# Patient Record
Sex: MALE | Race: WHITE | Employment: OTHER | URBAN - METROPOLITAN AREA
[De-identification: names, ages, dates, MRNs, and addresses within clinical notes are randomized per-mention and may not be internally consistent; named-entity substitution may affect disease eponyms.]

---

## 2024-10-30 ENCOUNTER — APPOINTMENT (OUTPATIENT)
Dept: CT IMAGING | Age: 74
End: 2024-10-30
Payer: MEDICARE

## 2024-10-30 ENCOUNTER — HOSPITAL ENCOUNTER (INPATIENT)
Age: 74
LOS: 2 days | Discharge: HOME | End: 2024-11-01
Attending: EMERGENCY MEDICINE | Admitting: SURGERY
Payer: MEDICARE

## 2024-10-30 ENCOUNTER — APPOINTMENT (OUTPATIENT)
Dept: GENERAL RADIOLOGY | Age: 74
End: 2024-10-30
Payer: MEDICARE

## 2024-10-30 DIAGNOSIS — R09.02 HYPOXEMIA: ICD-10-CM
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
## 2024-10-30 DIAGNOSIS — S22.000A COMPRESSION FRACTURE OF BODY OF THORACIC VERTEBRA (HCC): ICD-10-CM

## 2024-10-30 DIAGNOSIS — S22.49XA CLOSED FRACTURE OF MULTIPLE RIBS, UNSPECIFIED LATERALITY, INITIAL ENCOUNTER: ICD-10-CM

## 2024-10-30 PROBLEM — T14.90XA TRAUMA: Status: ACTIVE | Noted: 2024-10-30

## 2024-10-30 LAB
ALBUMIN SERPL-MCNC: 4.4 G/DL (ref 3.5–5.2)
ALP SERPL-CCNC: 131 U/L (ref 40–129)
ALT SERPL-CCNC: 25 U/L (ref 0–40)
ANION GAP SERPL CALCULATED.3IONS-SCNC: 10 MMOL/L (ref 7–16)
APAP SERPL-MCNC: <5 UG/ML (ref 10–30)
AST SERPL-CCNC: 46 U/L (ref 0–39)
B.E.: -2.5 MMOL/L (ref -3–3)
BILIRUB SERPL-MCNC: 1.6 MG/DL (ref 0–1.2)
BUN SERPL-MCNC: 15 MG/DL (ref 6–23)
CALCIUM SERPL-MCNC: 9.1 MG/DL (ref 8.6–10.2)
CHLORIDE SERPL-SCNC: 104 MMOL/L (ref 98–107)
CO2 SERPL-SCNC: 24 MMOL/L (ref 22–29)
COHB: 0.2 % (ref 0–1.5)
CREAT SERPL-MCNC: 0.9 MG/DL (ref 0.7–1.2)
CRITICAL: ABNORMAL
DATE ANALYZED: ABNORMAL
DATE OF COLLECTION: ABNORMAL
ERYTHROCYTE [DISTWIDTH] IN BLOOD BY AUTOMATED COUNT: 17 % (ref 11.5–15)
ETHANOLAMINE SERPL-MCNC: <10 MG/DL (ref 0–0.08)
GFR, ESTIMATED: >90 ML/MIN/1.73M2
GLUCOSE SERPL-MCNC: 110 MG/DL (ref 74–99)
HCO3: 20.9 MMOL/L (ref 22–26)
HCT VFR BLD AUTO: 36.5 % (ref 37–54)
HGB BLD-MCNC: 11.5 G/DL (ref 12.5–16.5)
HHB: 0.3 % (ref 0–5)
INR PPP: 1.2
LAB: ABNORMAL
LACTATE BLDV-SCNC: 1.4 MMOL/L (ref 0.5–2.2)
Lab: 1635
MCH RBC QN AUTO: 27.4 PG (ref 26–35)
MCHC RBC AUTO-ENTMCNC: 31.5 G/DL (ref 32–34.5)
MCV RBC AUTO: 86.9 FL (ref 80–99.9)
METHB: 0.6 % (ref 0–1.5)
MODE: ABNORMAL
O2 SATURATION: 99.7 % (ref 92–98.5)
O2HB: 98.9 % (ref 94–97)
OPERATOR ID: 7278
PARTIAL THROMBOPLASTIN TIME: 29.9 SEC (ref 24.5–35.1)
PATIENT TEMP: 37 C
PCO2: 32.2 MMHG (ref 35–45)
PH BLOOD GAS: 7.43 (ref 7.35–7.45)
PLATELET # BLD AUTO: 197 K/UL (ref 130–450)
PMV BLD AUTO: 9.4 FL (ref 7–12)
PO2: 291.7 MMHG (ref 75–100)
POTASSIUM SERPL-SCNC: 4.04 MMOL/L (ref 3.5–5)
POTASSIUM SERPL-SCNC: 4.1 MMOL/L (ref 3.5–5)
PROT SERPL-MCNC: 7 G/DL (ref 6.4–8.3)
PROTHROMBIN TIME: 13.1 SEC (ref 9.3–12.4)
RBC # BLD AUTO: 4.2 M/UL (ref 3.8–5.8)
SALICYLATES SERPL-MCNC: <0.3 MG/DL (ref 0–30)
SODIUM SERPL-SCNC: 138 MMOL/L (ref 132–146)
SOURCE, BLOOD GAS: ABNORMAL
THB: 12.5 G/DL (ref 11.5–16.5)
TIME ANALYZED: 1636
TOXIC TRICYCLIC SC,BLOOD: NEGATIVE
WBC OTHER # BLD: 17.3 K/UL (ref 4.5–11.5)

## 2024-10-30 PROCEDURE — 72125 CT NECK SPINE W/O DYE: CPT

## 2024-10-30 PROCEDURE — 70450 CT HEAD/BRAIN W/O DYE: CPT

## 2024-10-30 PROCEDURE — 6810039000 HC L1 TRAUMA ALERT

## 2024-10-30 PROCEDURE — 90714 TD VACC NO PRESV 7 YRS+ IM: CPT

## 2024-10-30 PROCEDURE — 71045 X-RAY EXAM CHEST 1 VIEW: CPT

## 2024-10-30 PROCEDURE — 2580000003 HC RX 258

## 2024-10-30 PROCEDURE — 72131 CT LUMBAR SPINE W/O DYE: CPT

## 2024-10-30 PROCEDURE — 82805 BLOOD GASES W/O2 SATURATION: CPT

## 2024-10-30 PROCEDURE — 99285 EMERGENCY DEPT VISIT HI MDM: CPT

## 2024-10-30 PROCEDURE — 99223 1ST HOSP IP/OBS HIGH 75: CPT | Performed by: SURGERY

## 2024-10-30 PROCEDURE — G0480 DRUG TEST DEF 1-7 CLASSES: HCPCS

## 2024-10-30 PROCEDURE — 2060000000 HC ICU INTERMEDIATE R&B

## 2024-10-30 PROCEDURE — 0HQ0XZZ REPAIR SCALP SKIN, EXTERNAL APPROACH: ICD-10-PCS

## 2024-10-30 PROCEDURE — 72128 CT CHEST SPINE W/O DYE: CPT

## 2024-10-30 PROCEDURE — 80307 DRUG TEST PRSMV CHEM ANLYZR: CPT

## 2024-10-30 PROCEDURE — 74177 CT ABD & PELVIS W/CONTRAST: CPT

## 2024-10-30 PROCEDURE — 80143 DRUG ASSAY ACETAMINOPHEN: CPT

## 2024-10-30 PROCEDURE — 72170 X-RAY EXAM OF PELVIS: CPT

## 2024-10-30 PROCEDURE — 71260 CT THORAX DX C+: CPT

## 2024-10-30 PROCEDURE — 80179 DRUG ASSAY SALICYLATE: CPT

## 2024-10-30 PROCEDURE — 6370000000 HC RX 637 (ALT 250 FOR IP)

## 2024-10-30 PROCEDURE — 85027 COMPLETE CBC AUTOMATED: CPT

## 2024-10-30 PROCEDURE — 96372 THER/PROPH/DIAG INJ SC/IM: CPT

## 2024-10-30 PROCEDURE — 83605 ASSAY OF LACTIC ACID: CPT

## 2024-10-30 PROCEDURE — 84132 ASSAY OF SERUM POTASSIUM: CPT

## 2024-10-30 PROCEDURE — 85610 PROTHROMBIN TIME: CPT

## 2024-10-30 PROCEDURE — 6360000002 HC RX W HCPCS

## 2024-10-30 PROCEDURE — 80053 COMPREHEN METABOLIC PANEL: CPT

## 2024-10-30 PROCEDURE — 85730 THROMBOPLASTIN TIME PARTIAL: CPT

## 2024-10-30 PROCEDURE — 90471 IMMUNIZATION ADMIN: CPT

## 2024-10-30 PROCEDURE — 6360000004 HC RX CONTRAST MEDICATION: Performed by: RADIOLOGY

## 2024-10-30 RX ORDER — ASPIRIN 81 MG/1
81 TABLET ORAL EVERY EVENING
Status: ON HOLD | COMMUNITY
End: 2024-11-01

## 2024-10-30 RX ORDER — SODIUM CHLORIDE 9 MG/ML
INJECTION, SOLUTION INTRAVENOUS PRN
Status: DISCONTINUED | OUTPATIENT
Start: 2024-10-30 | End: 2024-11-01 | Stop reason: HOSPADM

## 2024-10-30 RX ORDER — SODIUM CHLORIDE 9 MG/ML
INJECTION, SOLUTION INTRAVENOUS CONTINUOUS
Status: DISCONTINUED | OUTPATIENT
Start: 2024-10-30 | End: 2024-10-30 | Stop reason: CLARIF

## 2024-10-30 RX ORDER — IOPAMIDOL 755 MG/ML
75 INJECTION, SOLUTION INTRAVASCULAR
Status: COMPLETED | OUTPATIENT
Start: 2024-10-30 | End: 2024-10-30

## 2024-10-30 RX ORDER — OXYCODONE HYDROCHLORIDE 5 MG/1
5 TABLET ORAL EVERY 4 HOURS PRN
Status: DISCONTINUED | OUTPATIENT
Start: 2024-10-30 | End: 2024-11-01 | Stop reason: HOSPADM

## 2024-10-30 RX ORDER — ONDANSETRON 4 MG/1
4 TABLET, ORALLY DISINTEGRATING ORAL EVERY 8 HOURS PRN
Status: DISCONTINUED | OUTPATIENT
Start: 2024-10-30 | End: 2024-11-01 | Stop reason: HOSPADM

## 2024-10-30 RX ORDER — ACETAMINOPHEN 325 MG/1
650 TABLET ORAL EVERY 6 HOURS SCHEDULED
Status: DISCONTINUED | OUTPATIENT
Start: 2024-10-30 | End: 2024-10-30 | Stop reason: SDUPTHER

## 2024-10-30 RX ORDER — OXYCODONE HYDROCHLORIDE 10 MG/1
10 TABLET ORAL EVERY 4 HOURS PRN
Status: DISCONTINUED | OUTPATIENT
Start: 2024-10-30 | End: 2024-11-01

## 2024-10-30 RX ORDER — ACETAMINOPHEN 325 MG/1
650 TABLET ORAL EVERY 6 HOURS
Status: DISCONTINUED | OUTPATIENT
Start: 2024-10-30 | End: 2024-11-01 | Stop reason: HOSPADM

## 2024-10-30 RX ORDER — POLYETHYLENE GLYCOL 3350 17 G/17G
17 POWDER, FOR SOLUTION ORAL DAILY
Status: DISCONTINUED | OUTPATIENT
Start: 2024-10-30 | End: 2024-11-01

## 2024-10-30 RX ORDER — ONDANSETRON 2 MG/ML
4 INJECTION INTRAMUSCULAR; INTRAVENOUS EVERY 6 HOURS PRN
Status: DISCONTINUED | OUTPATIENT
Start: 2024-10-30 | End: 2024-11-01 | Stop reason: HOSPADM

## 2024-10-30 RX ORDER — SODIUM CHLORIDE 0.9 % (FLUSH) 0.9 %
10 SYRINGE (ML) INJECTION PRN
Status: DISCONTINUED | OUTPATIENT
Start: 2024-10-30 | End: 2024-11-01 | Stop reason: HOSPADM

## 2024-10-30 RX ORDER — BACITRACIN ZINC 500 [USP'U]/G
OINTMENT TOPICAL 3 TIMES DAILY
Status: DISCONTINUED | OUTPATIENT
Start: 2024-10-30 | End: 2024-11-01 | Stop reason: HOSPADM

## 2024-10-30 RX ORDER — LISINOPRIL 10 MG/1
10 TABLET ORAL EVERY EVENING
COMMUNITY

## 2024-10-30 RX ORDER — ESCITALOPRAM OXALATE 20 MG/1
20 TABLET ORAL EVERY EVENING
COMMUNITY

## 2024-10-30 RX ORDER — SODIUM CHLORIDE 0.9 % (FLUSH) 0.9 %
10 SYRINGE (ML) INJECTION EVERY 12 HOURS SCHEDULED
Status: DISCONTINUED | OUTPATIENT
Start: 2024-10-30 | End: 2024-11-01 | Stop reason: HOSPADM

## 2024-10-30 RX ORDER — MEMANTINE HYDROCHLORIDE 10 MG/1
10 TABLET ORAL 2 TIMES DAILY
COMMUNITY

## 2024-10-30 RX ORDER — SODIUM CHLORIDE 9 MG/ML
INJECTION, SOLUTION INTRAVENOUS CONTINUOUS
Status: DISCONTINUED | OUTPATIENT
Start: 2024-10-30 | End: 2024-10-31

## 2024-10-30 RX ADMIN — IOPAMIDOL 75 ML: 755 INJECTION, SOLUTION INTRAVENOUS at 17:11

## 2024-10-30 RX ADMIN — ACETAMINOPHEN 650 MG: 325 TABLET ORAL at 19:04

## 2024-10-30 RX ADMIN — SODIUM CHLORIDE: 9 INJECTION, SOLUTION INTRAVENOUS at 18:55

## 2024-10-30 RX ADMIN — CLOSTRIDIUM TETANI TOXOID ANTIGEN (FORMALDEHYDE INACTIVATED) AND CORYNEBACTERIUM DIPHTHERIAE TOXOID ANTIGEN (FORMALDEHYDE INACTIVATED) 0.5 ML: 5; 2 INJECTION, SUSPENSION INTRAMUSCULAR at 16:46

## 2024-10-30 RX ADMIN — HYDROMORPHONE HYDROCHLORIDE 0.5 MG: 1 INJECTION, SOLUTION INTRAMUSCULAR; INTRAVENOUS; SUBCUTANEOUS at 18:47

## 2024-10-30 ASSESSMENT — PAIN DESCRIPTION - LOCATION: LOCATION: BACK

## 2024-10-30 ASSESSMENT — PAIN DESCRIPTION - DESCRIPTORS: DESCRIPTORS: ACHING;DISCOMFORT;DULL

## 2024-10-30 ASSESSMENT — PAIN SCALES - GENERAL: PAINLEVEL_OUTOF10: 10

## 2024-10-30 NOTE — ED PROVIDER NOTES
HPI:  10/30/24, Time: 5:53 PM EDT         Andres Aviles is a 74 y.o. male presenting to the ED for fall.  Patient fell off 10 feet onto cement floor from a ladder.  Given his head, he is on aspirin, unclear if loss of conscious.  Does complain of back pain and head pain.  No nausea or vomiting.  No chest pain or shortness breath.  No fevers or chills.  No weakness or numbness in the arms or legs.  He was hemodynamically stable in the field.  No other symptoms or complaints.      Review of Systems:   A complete review of systems was performed and pertinent positives and negatives are stated within HPI, all other systems reviewed and are negative.          --------------------------------------------- PAST HISTORY ---------------------------------------------  Past Medical History:  has no past medical history on file.    Past Surgical History:  has no past surgical history on file.    Social History:      Family History: family history is not on file.     The patient’s home medications have been reviewed.    Allergies: Patient has no known allergies.    -------------------------------------------------- RESULTS -------------------------------------------------  All laboratory and radiology results have been personally reviewed by myself   LABS:  Results for orders placed or performed during the hospital encounter of 10/30/24   Blood Gas, Arterial   Result Value Ref Range    Date Analyzed 20241030     Time Analyzed 1636     Source: Blood Arterial     pH, Blood Gas 7.431 7.350 - 7.450    PCO2 32.2 (L) 35.0 - 45.0 mmHg    PO2 291.7 (H) 75.0 - 100.0 mmHg    HCO3 20.9 (L) 22.0 - 26.0 mmol/L    B.E. -2.5 -3.0 - 3.0 mmol/L    O2 Sat 99.7 (H) 92.0 - 98.5 %    O2Hb 98.9 (H) 94.0 - 97.0 %    COHb 0.2 0.0 - 1.5 %    MetHb 0.6 0.0 - 1.5 %    HHb 0.3 0.0 - 5.0 %    tHb (est) 12.5 11.5 - 16.5 g/dL    Potassium 4.04 3.50 - 5.00 mmol/L    Mode NRB 15L     Date Of Collection 20241030     Time Collected 1635     Pt Temp 37.0 C     present        Darren Tapia MD  10/30/24 1825

## 2024-10-30 NOTE — PROCEDURES
PROCEDURE NOTE  Date: 10/30/2024   Name: Andres Aviles  YOB: 1950    Procedures        LACERATION REPAIR PROCEDURE NOTE    Indication: Laceration - Scalp laceration with arterial bleeding 5 cm posterior scalp     Procedure: The patient was placed in the appropriate position and anesthesia and the area around the laceration was shaved revealing arterial bleeding from the wound. The area was then cleansed with betadine and draped in a sterile fashion. The arterial bleed was first controlled with figure of 8 3-0 vicryl sutures. The laceration was then closed in a running locking fashion with 3-0 vicryl. No further bleeding was noted.  The wound area was cleaned off, again no bleeding was noted.  Minimal debridement was preformed, flaps were aligned. No foreign body was identified.    Total repaired wound length: 5 cm.     Other Items: None    The patient tolerated the procedure well.    Complications: None      Lesly Rice MD  Resident, PGY-4  10/30/2024  5:03 PM

## 2024-10-30 NOTE — PROCEDURES
LACERATION REPAIR NOTE    Attending:  Dr. Cooper    Assistant: Lesly Rice, PGY-4 and Stephy Sargent PGY-2    Anesthesia: not needed    Laceration #: 1.  Location: posterior scalp    Length: 4 cm.    The wound area was irrigated with sterile saline, cleansed with povidone iodine and draped in a sterile fashion.      Wound complexity:    Debridement: None.  Undermining: None.  Wound Margins Revised: None.  Flaps Aligned: yes.  The wound was explored with the following results. Galea intact without any foreign bodies found. There were some subcutaneous vessels bleeding which were controlled with vicryl sutures in figure of eight fashion. The wound was then closed in running locking fashion with vicryl suture  Dressing:  bacitracin was ordered.     Dr. Poe was available    Electronically signed by Stephy Sargent DO on 10/30/2024 at 5:07 PM

## 2024-10-30 NOTE — ED NOTES
PATIENT LOG ROLLED , SPINAL PRECAUTIONS MAINTAINED  NO TENDERNESS , STEP OFFS OR DEFORMITIES   NO SIGNS OF TRAUMA

## 2024-10-30 NOTE — ED NOTES
Patient arrived via STAT , Working in his attic fell 10 ft on head , loss of approx 100cc of blood per STAT

## 2024-10-30 NOTE — H&P
TRAUMA HISTORY & PHYSICAL  Attending/Surgical Resident/Advance Practice Nurse  10/30/2024  5:02 PM    PRIMARY SURVEY    CHIEF COMPLAINT:  Trauma alert.    Injury occurred just prior to arrival. Pt was working in his attic above his garage when it fell out from under him and he fell. Hit his head but denies LOC. Takes ASA 81 daily. Only complaint of back pain    AIRWAY:   Airway Normal    EMS ETT Absent  Noisy respirations Absent  Retractions: Absent  Vomiting/bleeding: Absent    BREATHING:    Midaxillary breath sound left:  Normal  Midaxillary breath sound right:  Normal    Cough sound intensity:  good    FiO2:  15 L non-re breather mask    SMI 2500 mL.     CIRCULATION:   Femerol pulse intensity: Strong  Palpebral conjunctiva: Pink     Vitals:    10/30/24 1654   BP: 125/75   Pulse: 70   Resp: 17   Temp:    SpO2: 96%       Vitals:    10/30/24 1643 10/30/24 1651 10/30/24 1652 10/30/24 1654   BP:  113/81 113/81 125/75   Pulse: 81 74 73 70   Resp:  19 20 17   Temp:       SpO2:  (!) 88% 96% 96%        FAST EXAM: Deferred    Central Nervous System    GCS Initial 15 minutes   Eye  Motor  Verbal 4 - Opens eyes on own  6 - Follows simple motor commands  5 - Alert and oriented 4 - Opens eyes on own  6 - Follows simple motor commands  5 - Alert and oriented     Neuromuscular blockade: No  Pupil size:  Left 4 mm    Right 4 mm  Pupil reaction: Yes    Wiggles fingers: Left Yes Right Yes  Wiggles toes: Left Yes   Right Yes    Hand grasp:   Left  Present      Right  Present  Plantar flexion: Left  Present      Right   Present    Loss of consciousness:  No     History Obtained From:  Patient & EMS  Private Medical Doctor: Unknown, Provider      Pre-exisiting Medical History:  yes    Conditions: HTN    Medications: unsure of medications    Allergies: denies    Social History:   Tobacco use:  none  Alcohol use:  none  Illicit drug use:  no history of illicit drug use    Past Surgical History:  hernia repair    Anticoagulant use:  None  Antiplatelet use:   ASA    NSAID use in last 72 hours: no  Taken PCN in past:  yes  Last food/drink: lunch today  Last tetanus: unknown, updated in trauma bay     Family History:   No family history of anesthesia complications    Complaints:   Head:  None  Neck:   None  Chest:   None  Back:   Mild  Abdomen:   None  Extremities:   None  Comments:     Review of systems:  All negative unless otherwise noted.        SECONDARY SURVEY  Head/scalp: 4 cm laceration to posterior scalp    Face: Atraumatic    Eyes/ears/nose: Atraumatic      Pharynx/mouth: Atraumatic      Neck:  Atraumatic      Cervical spine tenderness:  Cervical collar in place at time of arrival  Pain:  none  ROM:  Not indicated     Chest wall:   Atraumatic       Heart:   Regular rate & rhythm    Abdomen:  Atraumatic.  Soft ND  Tenderness:  none    Pelvis: Atraumatic      Tenderness: none    Thoracolumbar spine: Atraumatic     Tenderness:  none    Genitourinary:  Atraumatic.  No blood or urine noted    Rectum: Atraumatic.  No blood noted.      Perineum: Atraumatic.  No blood or urine noted.      Extremities:   Sensory normal  Motor normal    Distal Pulses  Left arm normal  Right arm normal  Left leg normal  Right leg normal    Capillary refill  Left arm normal  Right arm normal  Left leg normal  Right leg normal    Procedures in ED:  Femoral arterial puncture and Laceration repair    In the event of Emergency Blood Transfusion:  Due to the critical condition of this patient, I request the immediate release of blood products for emergency transfusion secondary to shock. I understand the increased risks incurred by the lack of complete transfusion testing.      Radiology: Chest Xray, Pelvic Xray, Ct head, Ct cervical spine, CT chest, CT abdomen     Consultations: TBD pending scans    Admission/Diagnosis: s/p fall       Plan of Treatment:   - labs   - scans   - monitor hemodynamics   - local wound care to lac repair   - tertiary   - dispo pending

## 2024-10-31 ENCOUNTER — APPOINTMENT (OUTPATIENT)
Dept: CT IMAGING | Age: 74
End: 2024-10-31
Payer: MEDICARE

## 2024-10-31 ENCOUNTER — APPOINTMENT (OUTPATIENT)
Dept: GENERAL RADIOLOGY | Age: 74
End: 2024-10-31
Payer: MEDICARE

## 2024-10-31 LAB
AMPHET UR QL SCN: NEGATIVE
ANION GAP SERPL CALCULATED.3IONS-SCNC: 9 MMOL/L (ref 7–16)
B.E.: -2 MMOL/L (ref -3–3)
BARBITURATES UR QL SCN: NEGATIVE
BASOPHILS # BLD: 0.02 K/UL (ref 0–0.2)
BASOPHILS NFR BLD: 0 % (ref 0–2)
BENZODIAZ UR QL: NEGATIVE
BUN SERPL-MCNC: 20 MG/DL (ref 6–23)
BUPRENORPHINE UR QL: NEGATIVE
CALCIUM SERPL-MCNC: 8.3 MG/DL (ref 8.6–10.2)
CANNABINOIDS UR QL SCN: NEGATIVE
CHLORIDE SERPL-SCNC: 105 MMOL/L (ref 98–107)
CLOT ANGLE.KAOLIN INDUCED BLD RES TEG: 67.1 DEG (ref 53–70)
CO2 SERPL-SCNC: 23 MMOL/L (ref 22–29)
COCAINE UR QL SCN: NEGATIVE
COHB: 0.5 % (ref 0–1.5)
CREAT SERPL-MCNC: 0.7 MG/DL (ref 0.7–1.2)
CRITICAL: ABNORMAL
DATE ANALYZED: ABNORMAL
DATE OF COLLECTION: ABNORMAL
EOSINOPHIL # BLD: 0.02 K/UL (ref 0.05–0.5)
EOSINOPHILS RELATIVE PERCENT: 0 % (ref 0–6)
EPL-TEG: 1.2 % (ref 0–15)
ERYTHROCYTE [DISTWIDTH] IN BLOOD BY AUTOMATED COUNT: 17.1 % (ref 11.5–15)
FENTANYL UR QL: NEGATIVE
G-TEG: 6.9 KDYN/CM2 (ref 4.5–11)
GFR, ESTIMATED: >90 ML/MIN/1.73M2
GLUCOSE SERPL-MCNC: 102 MG/DL (ref 74–99)
HCO3: 22.2 MMOL/L (ref 22–26)
HCT VFR BLD AUTO: 29.4 % (ref 37–54)
HGB BLD-MCNC: 9.1 G/DL (ref 12.5–16.5)
HHB: 3.9 % (ref 0–5)
IMM GRANULOCYTES # BLD AUTO: <0.03 K/UL (ref 0–0.58)
IMM GRANULOCYTES NFR BLD: 0 % (ref 0–5)
KINETICS TEG: 1.8 MIN (ref 1–3)
LAB: ABNORMAL
LY30 (LYSIS) TEG: 1.2 % (ref 0–8)
LYMPHOCYTES NFR BLD: 0.66 K/UL (ref 1.5–4)
LYMPHOCYTES RELATIVE PERCENT: 9 % (ref 20–42)
Lab: 1010
MA (MAX CLOT) TEG: 57.9 MM (ref 50–70)
MCH RBC QN AUTO: 27.1 PG (ref 26–35)
MCHC RBC AUTO-ENTMCNC: 31 G/DL (ref 32–34.5)
MCV RBC AUTO: 87.5 FL (ref 80–99.9)
METHADONE UR QL: NEGATIVE
METHB: 0.3 % (ref 0–1.5)
MODE: ABNORMAL
MONOCYTES NFR BLD: 0.57 K/UL (ref 0.1–0.95)
MONOCYTES NFR BLD: 8 % (ref 2–12)
NEUTROPHILS NFR BLD: 82 % (ref 43–80)
NEUTS SEG NFR BLD: 5.72 K/UL (ref 1.8–7.3)
O2 SATURATION: 96.1 % (ref 92–98.5)
O2HB: 95.3 % (ref 94–97)
OPERATOR ID: 1868
OPIATES UR QL SCN: NEGATIVE
OXYCODONE UR QL SCN: NEGATIVE
PATIENT TEMP: 37 C
PCO2: 35.8 MMHG (ref 35–45)
PCP UR QL SCN: NEGATIVE
PH BLOOD GAS: 7.41 (ref 7.35–7.45)
PLATELET # BLD AUTO: 140 K/UL (ref 130–450)
PMV BLD AUTO: 10.2 FL (ref 7–12)
PO2: 91.6 MMHG (ref 75–100)
POTASSIUM SERPL-SCNC: 4.1 MMOL/L (ref 3.5–5)
RBC # BLD AUTO: 3.36 M/UL (ref 3.8–5.8)
REACTION TIME TEG: 2.7 MIN (ref 5–10)
SODIUM SERPL-SCNC: 137 MMOL/L (ref 132–146)
SOURCE, BLOOD GAS: ABNORMAL
TEST INFORMATION: NORMAL
THB: 10.3 G/DL (ref 11.5–16.5)
TIME ANALYZED: 1016
WBC OTHER # BLD: 7 K/UL (ref 4.5–11.5)

## 2024-10-31 PROCEDURE — 85347 COAGULATION TIME ACTIVATED: CPT

## 2024-10-31 PROCEDURE — 6370000000 HC RX 637 (ALT 250 FOR IP)

## 2024-10-31 PROCEDURE — 2060000000 HC ICU INTERMEDIATE R&B

## 2024-10-31 PROCEDURE — 85025 COMPLETE CBC W/AUTO DIFF WBC: CPT

## 2024-10-31 PROCEDURE — 2580000003 HC RX 258

## 2024-10-31 PROCEDURE — 80307 DRUG TEST PRSMV CHEM ANLYZR: CPT

## 2024-10-31 PROCEDURE — 6360000002 HC RX W HCPCS: Performed by: SURGERY

## 2024-10-31 PROCEDURE — 6370000000 HC RX 637 (ALT 250 FOR IP): Performed by: SURGERY

## 2024-10-31 PROCEDURE — 82805 BLOOD GASES W/O2 SATURATION: CPT

## 2024-10-31 PROCEDURE — 85390 FIBRINOLYSINS SCREEN I&R: CPT

## 2024-10-31 PROCEDURE — 36415 COLL VENOUS BLD VENIPUNCTURE: CPT

## 2024-10-31 PROCEDURE — 71045 X-RAY EXAM CHEST 1 VIEW: CPT

## 2024-10-31 PROCEDURE — 85576 BLOOD PLATELET AGGREGATION: CPT

## 2024-10-31 PROCEDURE — 80048 BASIC METABOLIC PNL TOTAL CA: CPT

## 2024-10-31 PROCEDURE — 85384 FIBRINOGEN ACTIVITY: CPT

## 2024-10-31 PROCEDURE — 99233 SBSQ HOSP IP/OBS HIGH 50: CPT | Performed by: SURGERY

## 2024-10-31 PROCEDURE — 70450 CT HEAD/BRAIN W/O DYE: CPT

## 2024-10-31 RX ORDER — FOLIC ACID 0.8 MG
800 TABLET ORAL DAILY
COMMUNITY
End: 2024-10-31

## 2024-10-31 RX ORDER — KETOROLAC TROMETHAMINE 30 MG/ML
15 INJECTION, SOLUTION INTRAMUSCULAR; INTRAVENOUS EVERY 6 HOURS
Status: DISCONTINUED | OUTPATIENT
Start: 2024-10-31 | End: 2024-11-01 | Stop reason: HOSPADM

## 2024-10-31 RX ORDER — MEMANTINE HYDROCHLORIDE 10 MG/1
10 TABLET ORAL 2 TIMES DAILY
Status: DISCONTINUED | OUTPATIENT
Start: 2024-10-31 | End: 2024-11-01 | Stop reason: HOSPADM

## 2024-10-31 RX ORDER — ESCITALOPRAM OXALATE 10 MG/1
20 TABLET ORAL DAILY
Status: DISCONTINUED | OUTPATIENT
Start: 2024-10-31 | End: 2024-11-01 | Stop reason: HOSPADM

## 2024-10-31 RX ORDER — LISINOPRIL 5 MG/1
10 TABLET ORAL DAILY
Status: DISCONTINUED | OUTPATIENT
Start: 2024-10-31 | End: 2024-11-01 | Stop reason: HOSPADM

## 2024-10-31 RX ORDER — CHOLECALCIFEROL (VITAMIN D3) 50 MCG
2000 TABLET ORAL DAILY
COMMUNITY

## 2024-10-31 RX ORDER — LIDOCAINE 4 G/G
1 PATCH TOPICAL DAILY
Status: DISCONTINUED | OUTPATIENT
Start: 2024-10-31 | End: 2024-11-01 | Stop reason: HOSPADM

## 2024-10-31 RX ORDER — HYDRALAZINE HYDROCHLORIDE 20 MG/ML
10 INJECTION INTRAMUSCULAR; INTRAVENOUS EVERY 30 MIN PRN
Status: DISCONTINUED | OUTPATIENT
Start: 2024-10-31 | End: 2024-11-01 | Stop reason: HOSPADM

## 2024-10-31 RX ORDER — LABETALOL HYDROCHLORIDE 5 MG/ML
10 INJECTION, SOLUTION INTRAVENOUS EVERY 30 MIN PRN
Status: DISCONTINUED | OUTPATIENT
Start: 2024-10-31 | End: 2024-11-01 | Stop reason: HOSPADM

## 2024-10-31 RX ORDER — METHOCARBAMOL 500 MG/1
1000 TABLET, FILM COATED ORAL 4 TIMES DAILY
Status: DISCONTINUED | OUTPATIENT
Start: 2024-10-31 | End: 2024-11-01

## 2024-10-31 RX ADMIN — MEMANTINE 10 MG: 10 TABLET ORAL at 15:39

## 2024-10-31 RX ADMIN — BACITRACIN ZINC: 500 OINTMENT TOPICAL at 12:10

## 2024-10-31 RX ADMIN — POLYETHYLENE GLYCOL 3350 17 G: 17 POWDER, FOR SOLUTION ORAL at 08:25

## 2024-10-31 RX ADMIN — BACITRACIN ZINC: 500 OINTMENT TOPICAL at 20:01

## 2024-10-31 RX ADMIN — KETOROLAC TROMETHAMINE 15 MG: 30 INJECTION, SOLUTION INTRAMUSCULAR at 08:25

## 2024-10-31 RX ADMIN — KETOROLAC TROMETHAMINE 15 MG: 30 INJECTION, SOLUTION INTRAMUSCULAR at 20:02

## 2024-10-31 RX ADMIN — SODIUM CHLORIDE, PRESERVATIVE FREE 10 ML: 5 INJECTION INTRAVENOUS at 08:25

## 2024-10-31 RX ADMIN — METHOCARBAMOL 1000 MG: 500 TABLET ORAL at 15:34

## 2024-10-31 RX ADMIN — ACETAMINOPHEN 650 MG: 325 TABLET ORAL at 08:26

## 2024-10-31 RX ADMIN — METHOCARBAMOL 1000 MG: 500 TABLET ORAL at 08:24

## 2024-10-31 RX ADMIN — ESCITALOPRAM OXALATE 20 MG: 10 TABLET ORAL at 08:24

## 2024-10-31 RX ADMIN — SODIUM CHLORIDE, PRESERVATIVE FREE 10 ML: 5 INJECTION INTRAVENOUS at 20:12

## 2024-10-31 RX ADMIN — OXYCODONE HYDROCHLORIDE 5 MG: 5 TABLET ORAL at 06:40

## 2024-10-31 RX ADMIN — ACETAMINOPHEN 650 MG: 325 TABLET ORAL at 15:37

## 2024-10-31 RX ADMIN — METHOCARBAMOL 1000 MG: 500 TABLET ORAL at 20:02

## 2024-10-31 RX ADMIN — KETOROLAC TROMETHAMINE 15 MG: 30 INJECTION, SOLUTION INTRAMUSCULAR at 15:33

## 2024-10-31 RX ADMIN — MEMANTINE 10 MG: 10 TABLET ORAL at 20:09

## 2024-10-31 RX ADMIN — LISINOPRIL 10 MG: 10 TABLET ORAL at 08:25

## 2024-10-31 ASSESSMENT — PAIN SCALES - GENERAL
PAINLEVEL_OUTOF10: 3
PAINLEVEL_OUTOF10: 9
PAINLEVEL_OUTOF10: 3
PAINLEVEL_OUTOF10: 3
PAINLEVEL_OUTOF10: 4
PAINLEVEL_OUTOF10: 9
PAINLEVEL_OUTOF10: 6

## 2024-10-31 ASSESSMENT — PAIN DESCRIPTION - DESCRIPTORS
DESCRIPTORS: ACHING;DISCOMFORT;SPASM
DESCRIPTORS: ACHING
DESCRIPTORS: ACHING;DISCOMFORT;SORE
DESCRIPTORS: ACHING;SORE
DESCRIPTORS: ACHING;DISCOMFORT;SPASM
DESCRIPTORS: ACHING
DESCRIPTORS: ACHING;SORE;DISCOMFORT

## 2024-10-31 ASSESSMENT — PAIN DESCRIPTION - LOCATION
LOCATION: BACK;HEAD
LOCATION: BACK
LOCATION: BACK;HEAD
LOCATION: BACK

## 2024-10-31 ASSESSMENT — ENCOUNTER SYMPTOMS
BACK PAIN: 0
NAUSEA: 0
BLOOD IN STOOL: 0
BACK PAIN: 1
ALLERGIC/IMMUNOLOGIC NEGATIVE: 1
COUGH: 0
SHORTNESS OF BREATH: 0
EYES NEGATIVE: 1
CONSTIPATION: 0
GASTROINTESTINAL NEGATIVE: 1
ABDOMINAL PAIN: 0
ANAL BLEEDING: 0
DIARRHEA: 0
ABDOMINAL DISTENTION: 0
RESPIRATORY NEGATIVE: 1
VOMITING: 0

## 2024-10-31 ASSESSMENT — PAIN DESCRIPTION - ORIENTATION
ORIENTATION: LOWER
ORIENTATION: POSTERIOR

## 2024-10-31 NOTE — PROGRESS NOTES
Cervical Spine C Collar Clearance -  Patient CT Spine Imaging normal.  Patient does not complain of Cervical Spine tenderness upon palpation.  Patients C-Spine ranged.  C-spine clear, no need for C-Collar.

## 2024-10-31 NOTE — DISCHARGE INSTRUCTIONS
measures.    Follow-up care is a key part of your treatment and safety. Be sure to make and go to all appointments, and call your doctor or nurse call line if you are having problems. It's also a good idea to know your test results and keep a list of the medicines you take.    How can you care for yourself at home?  Taking care of yourself  You may get dizzy if you do not drink enough water. To prevent dehydration, drink plenty of fluids, enough so that your urine is light yellow or clear like water. Choose water and other caffeine-free clear liquids. If you have kidney, heart, or liver disease and have to limit fluids, talk with your doctor before you increase the amount of fluids you drink.  Exercise regularly to improve your strength, muscle tone, and balance. Walk if you can. Swimming may be a good choice if you cannot walk easily.  Have your vision and hearing checked each year or any time you notice a change. If you have trouble seeing and hearing, you might not be able to avoid objects and could lose your balance.  Know the side effects of the medicines you take. Ask your doctor or pharmacist whether the medicines you take can affect your balance. Sleeping pills or sedatives can affect your balance.  Limit the amount of alcohol you drink. Alcohol can impair your balance and other senses.  Ask your doctor whether calluses or corns on your feet need to be removed. If you wear loose-fitting shoes because of calluses or corns, you can lose your balance and fall.  Talk to your doctor if you have numbness in your feet.    Preventing falls at home  Remove raised doorway thresholds, throw rugs, and clutter. Repair loose carpet or raised areas in the floor.  Move furniture and electrical cords to keep them out of walking paths.  Use non-skid floor wax, and wipe up spills right away, especially on ceramic tile floors.  If you use a walker or cane, put rubber tips on it. If you use crutches, clean the bottoms of them  poor concentration, or slow to answer questions  feeling dizzy, poor balance, or poor coordination  being sensitive to light  being sensitive to sounds  ringing in the ears  a mild headache, sometimes with nausea and/or vomiting  being irritable, having mood swings, or feeling somewhat sad or “down”  Contact the TRAUMA CLINIC if your symptoms are affecting your everyday activities. Remember that letting yourself get too tired can make your symptoms worse. Listen to your body: if doing a certain activity increases your symptoms, take a break from that activity. Build up the amount of time you do an activity and stay under the threshold of symptoms.     Long term Effects (Post-Concussive Syndrome)    Notify physician if any of the following persists longer than 2-4 weeks.    Difficulty with concentration or attention (easily distracted)  Frequent headaches  Memory problems   Sensitivity to light   Sleeping difficulties      There is a higher risk of having a more serious head injury if:  Previous history of head injury or concussion  Taking medicine that thins your blood, or have a bleeding disorder  Have other neurologic (brain) problems  Have difficulty walking or frequent falls  Active in high impact contact sports, like soccer or football.    Activities  Stay away from activities that could cause another head injury (like sports), until the doctor says it's okay. A second blow to the head can cause more damage to the brain  Limit reading, television, video games, etc. the first 48 hours. Your brain needs to rest so that it can recover. You may find that it helps to take time off school or work.   Limit exposure to bright lights, loud noises, and crowds for the first 48 hours, as these can make your symptoms worse  Limit use of screens, such as an IPad, computer, cellphone, TV, etc, as these can make symptoms, especially headaches, worse.      Work/School  It is recommended that you wait to return to work/school  until after your follow-up appointment with trauma or your family doctor.  If you are having no symptoms, please call for an earlier appointment  Some people find it hard to concentrate well so return to your normal activities slowly. Go back to work or school for half days at first, and increase as tolerated. Trauma services can help you with a graduated schedule.  If you feel comfortable doing so, tell work or school about your concussion. You may have to adjust your activities, depending on your job or school demands.       Rest and Sleep  Rest for the first 24 hours; it's one of the best things to help your brain recover.  It's okay to sleep if you want  You don't have to be woken up every few hours.  If someone does wake you up, you should awaken easily.    Do some light physical activity (housework) or light exercise (walking, stationary bike) as soon as you can tolerate the movement.  Strenuous exercise (such as jogging or weight lifting) can make your concussion symptoms worse or last longer.    Diet  Return to a normal diet as tolerated, you may want to start with liquids first  Eat healthy meals (including breakfast) and snacks throughout the day as your brain heals.    Managing Pain  Tylenol or Ibuprofen are the best meds to take for headaches.  Your doctor may have prescribed you medications if your headaches were severe or you have other injuries.  Please take as directed.  Driving  Your ability to concentrate and react quickly might be affected by the concussion.  Please contact trauma services for advice on when to resume driving.  Do not drive if you're concerned about vision problems, slowed thinking, slowed reaction time, reduced attention or poor judgment.  Wear sunglasses even during winter if ayush while driving.  The bright light may induce a headache.     Alcohol use/Drug use  Don't drink alcohol or use recreational drugs as they may make you feel worse and/or hide the warning signs.

## 2024-10-31 NOTE — CONSULTS
Neurosurgery Consult Note      CHIEF COMPLAINT: Fall through an attic floor above his garage sustaining fractures of the thoracic and lumbar spine    HPI: History and physical reviewed films reviewed patient seen and examinedInjury occurred just prior to arrival. Pt was working in his attic above his garage when it fell out from under him and he fell. Hit his head but denies LOC. Takes ASA 81 daily. Only complaint of back pain   .    Past Medical History:   Diagnosis Date    Arthritis     Cerebral artery occlusion with cerebral infarction (HCC)      Past Surgical History:   Procedure Laterality Date    HERNIA REPAIR       Patient has no known allergies.  Prior to Admission medications    Medication Sig Start Date End Date Taking? Authorizing Provider   Cholecalciferol (VITAMIN D3) 50 MCG (2000 UT) TABS Take 1 tablet by mouth daily   Yes Paz Esquivel MD   Folic Acid (FOLATE PO) Take 80 mcg by mouth daily   Yes Paz Esquivel MD   lisinopril (PRINIVIL;ZESTRIL) 10 MG tablet Take 1 tablet by mouth every evening   Yes Paz Esquivel MD   aspirin 81 MG EC tablet Take 1 tablet by mouth every evening   Yes Paz Esquivel MD   memantine (NAMENDA) 10 MG tablet Take 1 tablet by mouth 2 times daily   Yes Paz Esquivel MD   escitalopram (LEXAPRO) 20 MG tablet Take 1 tablet by mouth every evening   Yes Paz Esquivel MD     Outpatient Medications Marked as Taking for the 10/30/24 encounter (Hospital Encounter)   Medication Sig Dispense Refill    Cholecalciferol (VITAMIN D3) 50 MCG (2000 UT) TABS Take 1 tablet by mouth daily      Folic Acid (FOLATE PO) Take 80 mcg by mouth daily      lisinopril (PRINIVIL;ZESTRIL) 10 MG tablet Take 1 tablet by mouth every evening      aspirin 81 MG EC tablet Take 1 tablet by mouth every evening      memantine (NAMENDA) 10 MG tablet Take 1 tablet by mouth 2 times daily      escitalopram (LEXAPRO) 20 MG tablet Take 1 tablet by mouth every evening    levels accompanied by small posterior disc-osteophyte complexes and degenerative disc calcification.  No critical central spinal canal narrowing is seen.  Multilevel foraminal narrowing is present greatest at the C6-7 level on the left. The prevertebral and paravertebral soft tissues show no soft tissue hematoma or acute disease.     1.  No fracture or acute osseous abnormality. 2.  Cervical spine multilevel degenerative changes.     CT ABDOMEN PELVIS W IV CONTRAST Additional Contrast? None    Result Date: 10/30/2024  EXAMINATION: CT OF THE ABDOMEN AND PELVIS WITH CONTRAST 10/30/2024 4:59 pm TECHNIQUE: CT of the abdomen and pelvis was performed with the administration of intravenous contrast. Multiplanar reformatted images are provided for review. Automated exposure control, iterative reconstruction, and/or weight based adjustment of the mA/kV was utilized to reduce the radiation dose to as low as reasonably achievable. COMPARISON: None. HISTORY: ORDERING SYSTEM PROVIDED HISTORY: trauma TECHNOLOGIST PROVIDED HISTORY: Additional Contrast?->None Reason for exam:->trauma What reading provider will be dictating this exam?->CRC FINDINGS: Lower Chest: Mild, bilateral lower lobe and right middle lobe atelectasis. Organs: The liver, spleen, gallbladder, biliary tree, adrenal glands, and kidneys are unremarkable.  The pancreas is atrophic. GI/Bowel: A small hiatal hernia is noted.  The small bowel is unremarkable for a non oral contrast study.  Sigmoid colon diverticulosis is present.  The appendix is normal in appearance. Pelvis: The urinary bladder is well distended and unremarkable.  The seminal vesicles are symmetric in size.  Prostate gland calcifications are noted. Peritoneum/Retroperitoneum: No retroperitoneal or pelvic lymphadenopathy is identified.  No free fluid is seen in the abdomen and pelvis.  No abdominal or pelvic soft tissue mass is appreciated.  The abdominal aorta is atherosclerotic. Bones/Soft Tissues:  There is no effusion or pneumothorax. The cardiomediastinal silhouette is without acute process. The osseous structures are without acute process.     No acute process.       LABS:  CBC:   Lab Results   Component Value Date/Time    WBC 7.0 10/31/2024 07:08 AM    RBC 3.36 10/31/2024 07:08 AM    HGB 9.1 10/31/2024 07:08 AM    HCT 29.4 10/31/2024 07:08 AM    MCV 87.5 10/31/2024 07:08 AM    MCH 27.1 10/31/2024 07:08 AM    MCHC 31.0 10/31/2024 07:08 AM    RDW 17.1 10/31/2024 07:08 AM     10/31/2024 07:08 AM    MPV 10.2 10/31/2024 07:08 AM     BMP:    Lab Results   Component Value Date/Time     10/31/2024 07:08 AM    K 4.1 10/31/2024 07:08 AM     10/31/2024 07:08 AM    CO2 23 10/31/2024 07:08 AM    BUN 20 10/31/2024 07:08 AM    CREATININE 0.7 10/31/2024 07:08 AM    CALCIUM 8.3 10/31/2024 07:08 AM    LABGLOM >90 10/31/2024 07:08 AM    GLUCOSE 102 10/31/2024 07:08 AM     PT/INR:    Lab Results   Component Value Date/Time    PROTIME 13.1 10/30/2024 04:33 PM    INR 1.2 10/30/2024 04:33 PM       IMPRESSION: Acute compression fractures T9 and L2    RECOMMENDATIONS: Needs QuickDraw off-the-shelf TLSO brace will follow with you then PT OT can see and evaluate him    Thank you again for this consultation.      Tuan Puentes MD  10/31/2024

## 2024-10-31 NOTE — PROGRESS NOTES
[facility]  PROGRESS NOTE  ATTENDING NOTE        TRAUMA  MECHANISM:  fall from attic    Chief Complaint   Patient presents with    Trauma     FALL FROM 10 FT ONTO HEAD -LOC -BT        HPI   Trauma alert.    Injury occurred just prior to arrival. Pt was working in his attic above his garage when it fell out from under him and he fell. Hit his head but denies LOC. Takes ASA 81 daily. Only complaint of back pain    Patient Active Problem List   Diagnosis    Trauma       SUBJECTIVE/OVERNIGHT EVENTS:  Scalp laceration repair in trauma bay    Review of Systems   Constitutional:  Positive for activity change. Negative for appetite change and unexpected weight change.   HENT: Negative.     Eyes: Negative.    Respiratory: Negative.  Negative for cough and shortness of breath.    Cardiovascular:  Positive for chest pain. Negative for leg swelling.   Gastrointestinal: Negative.  Negative for abdominal distention, abdominal pain, anal bleeding, blood in stool, constipation, diarrhea, nausea and vomiting.   Endocrine: Negative.    Genitourinary: Negative.    Musculoskeletal:  Positive for myalgias. Negative for arthralgias, back pain, gait problem and joint swelling.   Skin: Negative.    Allergic/Immunologic: Negative.    Neurological:  Positive for headaches. Negative for dizziness and weakness.   Hematological: Negative.    Psychiatric/Behavioral:  Positive for confusion (baseline dementia). Negative for decreased concentration and sleep disturbance.        /69   Pulse 68   Temp 98.2 °F (36.8 °C) (Oral)   Resp 22   SpO2 95%   Physical Exam  Constitutional:       Appearance: Normal appearance.   HENT:      Head: Normocephalic.      Comments: Wound c/d/i     Nose: Nose normal.      Mouth/Throat:      Mouth: Mucous membranes are moist.      Pharynx: Oropharynx is clear.   Eyes:      Extraocular Movements: Extraocular movements intact.      Pupils: Pupils are equal, round, and reactive to light.    Cardiovascular:      Rate and Rhythm: Normal rate and regular rhythm.      Pulses: Normal pulses.      Heart sounds: Normal heart sounds.   Pulmonary:      Effort: Pulmonary effort is normal.      Breath sounds: Normal breath sounds.   Abdominal:      General: There is no distension.      Palpations: Abdomen is soft.      Tenderness: There is no abdominal tenderness.   Musculoskeletal:         General: No tenderness or signs of injury.      Cervical back: Normal range of motion and neck supple.   Skin:     General: Skin is warm and dry.   Neurological:      General: No focal deficit present.      Mental Status: He is alert.      Comments: GCS 14-15   Psychiatric:         Mood and Affect: Mood normal.         Behavior: Behavior normal.         Thought Content: Thought content normal.         Judgment: Judgment normal.         ASSESSMENT/PLAN:  Scalp laceration--LWC  Traumatic cephalohematoma--monitor  Dementia--namenda  T9 fracture--NSGY consult  L2 compression fracture--NSGY consult  HTN--lisinopril  Right rib fractures--analgesia, pulmonary toilet    INCIDENTAL FINDINGS:  none    AMPAC:  TBD/24    DVT/GI ppx:  bilateral SCDs/diet    Marija Sandhu MD, MSc, FACS  10/31/2024  2:58 PM

## 2024-10-31 NOTE — PROGRESS NOTES
GERIATRIC TRAUMA RESIDENT INITIAL ASSESSMENT    Chief Complaint   Patient presents with    Trauma     FALL FROM 10 FT ONTO HEAD -LOC -BT        Mechanism of Injury:  Fell from attic   Loss of consciousness:  No    HPI:  Patient states he was working in his attic above his garage, and he fell. He hit his head, but no loss of consciousness. CT chest shows non-displaced right 6th through 8th rib fracture, CT abdomen shows fracture of the T9 vertebral body. CT head shows large right posterior parietal scalp hematoma. Laceration repair was done. On evaluation today, he denies any acute concern.     No past medical history on file.    No past surgical history on file.    No family history on file.    No Known Allergies         Medications:  Medications are listed below. I have reviewed all the medications against Beer's criteria, and any medicaiton changes have been listed in my assessment/plan.  Medication reconcilliation obtained from   []  Patient      []  Family member                    []  Pharmacy    Current Facility-Administered Medications   Medication Dose Route Frequency Provider Last Rate Last Admin    lisinopril (PRINIVIL;ZESTRIL) tablet 10 mg  10 mg Oral Daily Renea Argueta MD        escitalopram (LEXAPRO) tablet 20 mg  20 mg Oral Daily Renea Argueta MD        [START ON 11/1/2024] vitamin D3 (CHOLECALCIFEROL) tablet 2,500 Units  2,500 Units Oral Q MWF Renea Argueta MD        bacitracin zinc ointment   Topical TID Stephy Sargent DO        0.9 % sodium chloride infusion   IntraVENous Continuous Stephy Sargent DO 75 mL/hr at 10/30/24 1855 New Bag at 10/30/24 1855    sodium chloride flush 0.9 % injection 10 mL  10 mL IntraVENous 2 times per day Lesly Rice MD        sodium chloride flush 0.9 % injection 10 mL  10 mL IntraVENous PRN Lesly Rice MD        0.9 % sodium chloride infusion   IntraVENous PRN Lesly Rice MD        ondansetron (ZOFRAN-ODT) disintegrating tablet 4 mg  4 mg  No tracheal deviation.   Cardiovascular:      Rate and Rhythm: Normal rate.      Heart sounds: No murmur heard.  Pulmonary:      Effort: Pulmonary effort is normal. No respiratory distress.      Breath sounds: Normal breath sounds.   Abdominal:      General: Bowel sounds are normal. There is no distension.      Palpations: Abdomen is soft.      Tenderness: There is no abdominal tenderness.   Musculoskeletal:         General: Normal range of motion.      Cervical back: Normal range of motion.   Skin:     General: Skin is warm and dry.   Neurological:      Mental Status: He is alert and oriented to person, place, and time.   Psychiatric:         Behavior: Behavior normal.         Thought Content: Thought content normal.         Judgment: Judgment normal.           Labs:  All labs since admission were reviewed, and these were the abnormalities I observed:   None    Radiology:  I have reviewed all the radiological studies this admission    ASSESSMENT/PLAN:  Mechanical fall with posterior scalp laceration  Mild cognitive impairment  Continue current medical management  PT/OT consulted

## 2024-10-31 NOTE — PROGRESS NOTES
BRIEF COMPREHENSIVE GERIATRIC ASSESSMENT    Clinical Frailty Score (Melissa Scale):  2-WELL    Cognition:   []  Within normal limits     [x]  Mild cognitive impairment   []  Dementia  []  Delirium    Abbreviated Mental Test (AMT-4) score:  3 (age, , place, year; one point for knowing each)            []  Mental Capacity Assessment required (if AMT-4 score <4/4, consult speech for cog eval)  Main life-long occupation:    Highest level of education:  College    Emotional:  [x]  Within normal limits     []  Dec mood     []  Depression  []  Anxiety  []  Fatigue    []  Hallucination     []  Delusion  []  Other  Motivation:    []  High    [x]  Usual  []  Low  Health attitude:     []  Excellent    [x]  Good    []  Fair    []  Poor     []  Couldn't say  Communication:  Speech:   [x]  WNL   []  Impaired Hearing:   []  WNL   []  Impaired           Vision:     []  WNL   [x]  Impaired (glasses) Understanding:   []  WNL   []  Impaired  Strength:   [x]  WNL   []  Weak Upper:    proximal    distal    Lower:    proximal    distal   Exercise:   []  Frequent    [x]  Occasional    []  None   What type of exercise?  walking  Balance:   [x]  WNL   []  Impaired  # falls in last month:  none #falls in the last 6 months:  none  Mobility:  walk inside      [x]  Independent    []  Slow    []  Assisted    []  Can't         Walk outside   [x]  Independent    []  Slow    []  Assisted    []  Can't         Transfers         [x]  Independent    []  Slow    []  Assisted    []  Can't                    Bed (in/out)     [x]  Independent    []  Slow    []  Assisted    []  Can't                    Aid use            [x]  Independent    []  Slow    []  Assisted    []  Can't   Nutrition:  Weight:  [x]  Normal    []  Under    []  Over    []  Obese   []  Dietary consult           Appetite: [x]  WNL    []  Fair    []  Poor         Patient reported symptoms of dysphagia?  No Nurse reported? No         Patient passed a bedside swallow if

## 2024-10-31 NOTE — DISCHARGE SUMMARY
Physician Discharge Summary     Patient ID:  Andres Aviles  [id number]  74 y.o.  1950    Admit date: 10/30/2024    Discharge date and time: 11/01/24 3:29 PM    Admitting Physician: Marisol Cooper MD     Admission Diagnoses: Hypoxemia [R09.02]  Trauma [T14.90XA]  Fall from ladder, initial encounter [W11.XXXA]  Closed fracture of multiple ribs, unspecified laterality, initial encounter [S22.49XA]  Compression fracture of body of thoracic vertebra (HCC) [S22.000A]    Discharge Diagnoses: Principal Problem:    Trauma  Active Problems:    Accidental fall from ladder  Resolved Problems:    * No resolved hospital problems. *      Admission Condition: fair    Discharged Condition: stable      Hospital Course:    10/30: Presented as trauma alert. Pt was working in his attic above his garage when it fell out from under him and he fell. Hit his head but denies LOC. Takes ASA 81 daily. Only complaint of back pain. Pt was found to have T9 and L2 compression fracture, L1 transverse process fracture, Right rib 6-8th fractures, 5 cm scalp laceration with arterial bleed s/p repair in trauma bay.   10/31: No acute findings on Tert exam. Pt complaining of back pain. Saturating well on 2 L NC. Neurosurgery consulted, awaiting recs.   11/1: Agitated overnight requiring haldol and restraints. Added depokote. Pt worked with PT and got 17/24. He is stable for discharge home.     Consults:   IP CONSULT TO NEUROSURGERY  INPATIENT CONSULT TO ORTHOTIST/PROSTHETIST    Significant Diagnostic Studies:   CT HEAD WO CONTRAST    Result Date: 10/31/2024  EXAMINATION: CT OF THE HEAD WITHOUT CONTRAST  10/31/2024 11:17 am TECHNIQUE: CT of the head was performed without the administration of intravenous contrast. Automated exposure control, iterative reconstruction, and/or weight based adjustment of the mA/kV was utilized to reduce the radiation dose to as low as reasonably achievable. COMPARISON: CT brain, 230, 2024. HISTORY: ORDERING SYSTEM  PROVIDED HISTORY: altered mental status TECHNOLOGIST PROVIDED HISTORY: Has a \"code stroke\" or \"stroke alert\" been called?->No Reason for exam:->altered mental status What reading provider will be dictating this exam?->CRC FINDINGS: BRAIN/VENTRICLES: There is no acute intracranial hemorrhage, mass effect or midline shift.  No abnormal extra-axial fluid collection.  The gray-white differentiation is maintained without evidence of an acute infarct.  There is no evidence of hydrocephalus. Ventricles mildly enlarged with sulci mildly prominent. ORBITS: The visualized portion of the orbits demonstrate no acute abnormality. SINUSES: The visualized paranasal sinuses and mastoid air cells demonstrate minimal mucosal thickening left maxillary sinus.  Congenital AP lesion right frontal sinus.  Mastoid air cells well pneumatized. SOFT TISSUES/SKULL:  Extra calvarial right posterior parietal scalp hematoma extending cephalad to vertex is again identified.  A more focal acute 1.9 x 1.4 cm scalp hematoma is found within the right posterior parietal region near the vertex.     Acute on chronic right posterior parietal scalp hematoma. Mild cerebral atrophy.     XR CHEST PORTABLE    Result Date: 10/31/2024  EXAMINATION: ONE XRAY VIEW OF THE CHEST 10/31/2024 8:56 am COMPARISON: 10/30/2024 HISTORY: ORDERING SYSTEM PROVIDED HISTORY: rib fx TECHNOLOGIST PROVIDED HISTORY: Reason for exam:->rib fx FINDINGS: Heart size is normal.  There are no infiltrates or effusions.  No acute rib fractures are noted.     No acute cardiopulmonary process.     CT LUMBAR SPINE WO CONTRAST    Result Date: 10/30/2024  EXAMINATION: CT OF THE LUMBAR SPINE WITHOUT CONTRAST; CT OF THE THORACIC SPINE WITHOUT CONTRAST  10/30/2024 TECHNIQUE: CT of the lumbar spine was performed without the administration of intravenous contrast. Multiplanar reformatted images are provided for review. Adjustment of mA and/or kV according to patient size was utilized.  Automated

## 2024-10-31 NOTE — PROGRESS NOTES
Patient reexamined for page about him being more altered.  Per nurses patient was trying to get out of bed and remove his IV lines and when asked about what he was doing patient stated that he did not know what he was doing.  He was also pushing the call button instead of volume button although he was redirected to where it was multiple times.  Per wife and son in the room patient is at his baseline.  He has had multiple episodes where he would leave the house and then get lost as he does not know where he is going.  Patient was diagnosed with dementia a year ago and has been on memantine. Upon reevaluation now patient seems to be GCS 14 with no neurological findings.

## 2024-10-31 NOTE — ED NOTES
Went into pt room to check on him, pt had aspen collar off. When this nurse asked why the aspen collar was off pt stated that there was a doctor that came in that made him to neck movements without the collar and said he did not need it on for right now.

## 2024-10-31 NOTE — PROGRESS NOTES
Trauma Tertiary Survey    Admit Date: 10/30/2024  Hospital day 1    CC:    Chief Complaint   Patient presents with    Trauma     FALL FROM 10 FT ONTO HEAD -LOC -BT        Alcohol pre-screening:  Men: How many times in the past year have you had 5 or more drinks in a day?  3 drinks a week.  How much do you drink on a daily basis?  Does not drink daily    Drug Pre-screening:    How many times in the past year have you used a recreational drug or used a prescription medication for non medical reasons?  none    Mood Prescreening:    During the past two weeks, have you been bothered by little interest or pleasure doing things?  No  During the past two weeks, have you been bothered by feeling down, depressed or hopeless?  No      Scheduled Meds:   lisinopril  10 mg Oral Daily    escitalopram  20 mg Oral Daily    [START ON 11/1/2024] vitamin D3  2,500 Units Oral Q MWF    ketorolac  15 mg IntraVENous Q6H    lidocaine  1 patch TransDERmal Daily    methocarbamol  1,000 mg Oral 4x Daily    memantine  10 mg Oral BID    bacitracin zinc   Topical TID    sodium chloride flush  10 mL IntraVENous 2 times per day    polyethylene glycol  17 g Oral Daily    acetaminophen  650 mg Oral Q6H     Continuous Infusions:   sodium chloride 75 mL/hr at 10/30/24 1855    sodium chloride       PRN Meds:labetalol, hydrALAZINE, sodium chloride flush, sodium chloride, ondansetron **OR** ondansetron, oxyCODONE **OR** oxyCODONE, HYDROmorphone    Subjective:     No acute events overnight.  Patient is complaining of moderate back pain otherwise he had no other complaints.     Objective:   Patient Vitals for the past 8 hrs:   BP Temp Temp src Pulse Resp SpO2   10/31/24 1100 (!) 171/72 -- -- 82 17 --   10/31/24 0915 -- -- -- -- -- 95 %   10/31/24 0715 (!) 147/79 98.2 °F (36.8 °C) Oral 69 20 96 %       No intake/output data recorded.  No intake/output data recorded.    No past medical history on file.    Scheduled Meds:   lisinopril  10 mg Oral Daily

## 2024-11-01 VITALS
TEMPERATURE: 97.5 F | HEART RATE: 88 BPM | WEIGHT: 158.07 LBS | OXYGEN SATURATION: 93 % | SYSTOLIC BLOOD PRESSURE: 123 MMHG | HEIGHT: 67 IN | DIASTOLIC BLOOD PRESSURE: 70 MMHG | BODY MASS INDEX: 24.81 KG/M2 | RESPIRATION RATE: 23 BRPM

## 2024-11-01 PROBLEM — S22.000A COMPRESSION FRACTURE OF BODY OF THORACIC VERTEBRA (HCC): Status: ACTIVE | Noted: 2024-11-01

## 2024-11-01 PROBLEM — S22.49XA CLOSED FRACTURE OF MULTIPLE RIBS: Status: ACTIVE | Noted: 2024-11-01
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
## 2024-11-01 LAB
ANION GAP SERPL CALCULATED.3IONS-SCNC: 12 MMOL/L (ref 7–16)
BASOPHILS # BLD: 0.05 K/UL (ref 0–0.2)
BASOPHILS NFR BLD: 1 % (ref 0–2)
BUN SERPL-MCNC: 21 MG/DL (ref 6–23)
CALCIUM SERPL-MCNC: 9 MG/DL (ref 8.6–10.2)
CHLORIDE SERPL-SCNC: 104 MMOL/L (ref 98–107)
CO2 SERPL-SCNC: 23 MMOL/L (ref 22–29)
CREAT SERPL-MCNC: 0.7 MG/DL (ref 0.7–1.2)
EOSINOPHIL # BLD: 0.08 K/UL (ref 0.05–0.5)
EOSINOPHILS RELATIVE PERCENT: 1 % (ref 0–6)
ERYTHROCYTE [DISTWIDTH] IN BLOOD BY AUTOMATED COUNT: 17.2 % (ref 11.5–15)
GFR, ESTIMATED: >90 ML/MIN/1.73M2
GLUCOSE BLD-MCNC: 130 MG/DL (ref 74–99)
GLUCOSE SERPL-MCNC: 119 MG/DL (ref 74–99)
HCT VFR BLD AUTO: 30.7 % (ref 37–54)
HGB BLD-MCNC: 9.5 G/DL (ref 12.5–16.5)
IMM GRANULOCYTES # BLD AUTO: 0.07 K/UL (ref 0–0.58)
IMM GRANULOCYTES NFR BLD: 1 % (ref 0–5)
LYMPHOCYTES NFR BLD: 0.81 K/UL (ref 1.5–4)
LYMPHOCYTES RELATIVE PERCENT: 8 % (ref 20–42)
MCH RBC QN AUTO: 27 PG (ref 26–35)
MCHC RBC AUTO-ENTMCNC: 30.9 G/DL (ref 32–34.5)
MCV RBC AUTO: 87.2 FL (ref 80–99.9)
MONOCYTES NFR BLD: 0.8 K/UL (ref 0.1–0.95)
MONOCYTES NFR BLD: 8 % (ref 2–12)
NEUTROPHILS NFR BLD: 82 % (ref 43–80)
NEUTS SEG NFR BLD: 8.5 K/UL (ref 1.8–7.3)
PLATELET # BLD AUTO: 144 K/UL (ref 130–450)
PMV BLD AUTO: 10.4 FL (ref 7–12)
POTASSIUM SERPL-SCNC: 4.2 MMOL/L (ref 3.5–5)
RBC # BLD AUTO: 3.52 M/UL (ref 3.8–5.8)
SODIUM SERPL-SCNC: 139 MMOL/L (ref 132–146)
WBC OTHER # BLD: 10.3 K/UL (ref 4.5–11.5)

## 2024-11-01 PROCEDURE — 97165 OT EVAL LOW COMPLEX 30 MIN: CPT

## 2024-11-01 PROCEDURE — 6360000002 HC RX W HCPCS: Performed by: SURGERY

## 2024-11-01 PROCEDURE — 82962 GLUCOSE BLOOD TEST: CPT

## 2024-11-01 PROCEDURE — 97161 PT EVAL LOW COMPLEX 20 MIN: CPT

## 2024-11-01 PROCEDURE — 6370000000 HC RX 637 (ALT 250 FOR IP)

## 2024-11-01 PROCEDURE — 80048 BASIC METABOLIC PNL TOTAL CA: CPT

## 2024-11-01 PROCEDURE — 6370000000 HC RX 637 (ALT 250 FOR IP): Performed by: SURGERY

## 2024-11-01 PROCEDURE — 97535 SELF CARE MNGMENT TRAINING: CPT

## 2024-11-01 PROCEDURE — 36415 COLL VENOUS BLD VENIPUNCTURE: CPT

## 2024-11-01 PROCEDURE — 6360000002 HC RX W HCPCS

## 2024-11-01 PROCEDURE — 85025 COMPLETE CBC W/AUTO DIFF WBC: CPT

## 2024-11-01 PROCEDURE — 99238 HOSP IP/OBS DSCHRG MGMT 30/<: CPT | Performed by: SURGERY

## 2024-11-01 PROCEDURE — 97530 THERAPEUTIC ACTIVITIES: CPT

## 2024-11-01 PROCEDURE — 2580000003 HC RX 258

## 2024-11-01 RX ORDER — SENNOSIDES A AND B 8.6 MG/1
1 TABLET, FILM COATED ORAL NIGHTLY
Status: DISCONTINUED | OUTPATIENT
Start: 2024-11-01 | End: 2024-11-01 | Stop reason: HOSPADM

## 2024-11-01 RX ORDER — SENNOSIDES A AND B 8.6 MG/1
1 TABLET, FILM COATED ORAL NIGHTLY
Qty: 30 TABLET | Refills: 0 | Status: SHIPPED | OUTPATIENT
Start: 2024-11-01 | End: 2024-12-01

## 2024-11-01 RX ORDER — METHOCARBAMOL 500 MG/1
500 TABLET, FILM COATED ORAL 4 TIMES DAILY
Status: DISCONTINUED | OUTPATIENT
Start: 2024-11-01 | End: 2024-11-01 | Stop reason: HOSPADM

## 2024-11-01 RX ORDER — HALOPERIDOL 5 MG/ML
5 INJECTION INTRAMUSCULAR ONCE
Status: COMPLETED | OUTPATIENT
Start: 2024-11-01 | End: 2024-11-01

## 2024-11-01 RX ORDER — ASPIRIN 81 MG/1
81 TABLET ORAL EVERY EVENING
Qty: 30 TABLET | Refills: 3 | Status: SHIPPED | OUTPATIENT
Start: 2024-11-08

## 2024-11-01 RX ORDER — BACITRACIN ZINC 500 [USP'U]/G
OINTMENT TOPICAL
Qty: 30 G | Refills: 1 | Status: SHIPPED | OUTPATIENT
Start: 2024-11-01 | End: 2024-11-11

## 2024-11-01 RX ORDER — ONDANSETRON 4 MG/1
4 TABLET, ORALLY DISINTEGRATING ORAL EVERY 8 HOURS PRN
Qty: 21 TABLET | Refills: 0 | Status: SHIPPED | OUTPATIENT
Start: 2024-11-01 | End: 2024-11-08

## 2024-11-01 RX ORDER — OXYCODONE HYDROCHLORIDE 5 MG/1
2.5 TABLET ORAL EVERY 6 HOURS PRN
Qty: 12 TABLET | Refills: 0 | Status: SHIPPED | OUTPATIENT
Start: 2024-11-01 | End: 2024-11-07

## 2024-11-01 RX ORDER — MECOBALAMIN 5000 MCG
5 TABLET,DISINTEGRATING ORAL NIGHTLY
Status: DISCONTINUED | OUTPATIENT
Start: 2024-11-01 | End: 2024-11-01 | Stop reason: HOSPADM

## 2024-11-01 RX ORDER — POLYETHYLENE GLYCOL 3350 17 G/17G
17 POWDER, FOR SOLUTION ORAL 2 TIMES DAILY
Status: DISCONTINUED | OUTPATIENT
Start: 2024-11-01 | End: 2024-11-01 | Stop reason: HOSPADM

## 2024-11-01 RX ORDER — METHOCARBAMOL 500 MG/1
500 TABLET, FILM COATED ORAL 4 TIMES DAILY
Qty: 40 TABLET | Refills: 0 | Status: SHIPPED | OUTPATIENT
Start: 2024-11-01 | End: 2024-11-11

## 2024-11-01 RX ORDER — DIVALPROEX SODIUM 125 MG/1
125 CAPSULE, COATED PELLETS ORAL EVERY 8 HOURS SCHEDULED
Status: DISCONTINUED | OUTPATIENT
Start: 2024-11-01 | End: 2024-11-01 | Stop reason: HOSPADM

## 2024-11-01 RX ORDER — HALOPERIDOL 5 MG/ML
INJECTION INTRAMUSCULAR
Status: COMPLETED
Start: 2024-11-01 | End: 2024-11-01

## 2024-11-01 RX ADMIN — POLYETHYLENE GLYCOL 3350 17 G: 17 POWDER, FOR SOLUTION ORAL at 09:01

## 2024-11-01 RX ADMIN — ACETAMINOPHEN 650 MG: 325 TABLET ORAL at 12:58

## 2024-11-01 RX ADMIN — KETOROLAC TROMETHAMINE 15 MG: 30 INJECTION, SOLUTION INTRAMUSCULAR at 09:01

## 2024-11-01 RX ADMIN — DIVALPROEX SODIUM 125 MG: 125 CAPSULE ORAL at 09:01

## 2024-11-01 RX ADMIN — METHOCARBAMOL 500 MG: 500 TABLET ORAL at 12:58

## 2024-11-01 RX ADMIN — SODIUM CHLORIDE, PRESERVATIVE FREE 10 ML: 5 INJECTION INTRAVENOUS at 09:05

## 2024-11-01 RX ADMIN — BACITRACIN ZINC: 500 OINTMENT TOPICAL at 09:02

## 2024-11-01 RX ADMIN — ESCITALOPRAM OXALATE 20 MG: 10 TABLET ORAL at 09:02

## 2024-11-01 RX ADMIN — DIVALPROEX SODIUM 125 MG: 125 CAPSULE ORAL at 15:27

## 2024-11-01 RX ADMIN — HALOPERIDOL 5 MG: 5 INJECTION INTRAMUSCULAR at 05:00

## 2024-11-01 RX ADMIN — LISINOPRIL 10 MG: 10 TABLET ORAL at 09:02

## 2024-11-01 RX ADMIN — KETOROLAC TROMETHAMINE 15 MG: 30 INJECTION, SOLUTION INTRAMUSCULAR at 15:25

## 2024-11-01 RX ADMIN — MEMANTINE 10 MG: 10 TABLET ORAL at 11:12

## 2024-11-01 RX ADMIN — METHOCARBAMOL 500 MG: 500 TABLET ORAL at 09:02

## 2024-11-01 RX ADMIN — BACITRACIN ZINC: 500 OINTMENT TOPICAL at 15:27

## 2024-11-01 RX ADMIN — HALOPERIDOL LACTATE 5 MG: 5 INJECTION, SOLUTION INTRAMUSCULAR at 05:00

## 2024-11-01 ASSESSMENT — PAIN SCALES - GENERAL
PAINLEVEL_OUTOF10: 3
PAINLEVEL_OUTOF10: 3
PAINLEVEL_OUTOF10: 0

## 2024-11-01 ASSESSMENT — PAIN DESCRIPTION - LOCATION: LOCATION: BACK

## 2024-11-01 ASSESSMENT — PAIN DESCRIPTION - ORIENTATION: ORIENTATION: LOWER

## 2024-11-01 ASSESSMENT — PAIN DESCRIPTION - DESCRIPTORS: DESCRIPTORS: ACHING;SORE

## 2024-11-01 NOTE — PLAN OF CARE
Problem: Safety - Medical Restraint  Goal: Remains free of injury from restraints (Restraint for Interference with Medical Device)  Description: INTERVENTIONS:  1. Determine that other, less restrictive measures have been tried or would not be effective before applying the restraint  2. Evaluate the patient's condition at the time of restraint application  3. Inform patient/family regarding the reason for restraint  4. Q2H: Monitor safety, psychosocial status, comfort, nutrition and hydration  Outcome: Progressing     Problem: Safety - Adult  Goal: Free from fall injury  10/31/2024 2347 by Noe Yañez RN  Outcome: Progressing  10/31/2024 1723 by Michell Jimenez, RN  Outcome: Progressing     Problem: Chronic Conditions and Co-morbidities  Goal: Patient's chronic conditions and co-morbidity symptoms are monitored and maintained or improved  10/31/2024 2347 by Noe Yañez, RN  Outcome: Progressing  10/31/2024 1723 by Michell Jimenez, RN  Outcome: Progressing

## 2024-11-01 NOTE — PROGRESS NOTES
Soft bilateral wrist restraints initiated to maintain patient safety. Patient continues to have poor safety awareness and increased risk for further injury with patients actions. Patient is pulling and removing lines, medical monitoring/ telemetry monitor, attempting to turn off bed alarms, Jumping out of bed without requesting assistance and refusing to wear ordered brace. Attempts to redirect and provide patient education regarding plan of care and importance of maintaining his safety have been unsuccessful at this time and have been unable to redirect the patient. Will continue to monitor closely, provide patient education and evaluate continued need for bilateral wrist restraints.

## 2024-11-01 NOTE — PROGRESS NOTES
Pt no longer requiring restraints. Bedside  ordered for pt safety. Electronically signed by Richie Ocampo RN on 11/1/2024 at 10:44 AM

## 2024-11-01 NOTE — ACP (ADVANCE CARE PLANNING)
Advance Care Planning   Healthcare Decision Maker:    Primary Decision Maker: Shruti Aviles - [facility] - [phone]    Click here to complete Healthcare Decision Makers including selection of the Healthcare Decision Maker Relationship (ie \"Primary\").

## 2024-11-01 NOTE — PROGRESS NOTES
CLINICAL PHARMACY NOTE: MEDS TO BEDS    Total # of Prescriptions Filled: 6   The following medications were delivered to the patient:  Bacitracin oint  Methocarbamol 500 mg  Senna 8.6 mg  Ondansetron 4 mg  Oxycodone 5 mg  Aspirin 81 mg    Additional Documentation:   Son picked up in pharmacy

## 2024-11-01 NOTE — PLAN OF CARE
Problem: Safety - Medical Restraint  Goal: Remains free of injury from restraints (Restraint for Interference with Medical Device)  Description: INTERVENTIONS:  1. Determine that other, less restrictive measures have been tried or would not be effective before applying the restraint  2. Evaluate the patient's condition at the time of restraint application  3. Inform patient/family regarding the reason for restraint  4. Q2H: Monitor safety, psychosocial status, comfort, nutrition and hydration  11/1/2024 0557 by Noe Yañez RN  Outcome: Progressing  10/31/2024 2357 by Noe Yañez RN  Outcome: Progressing  10/31/2024 2347 by Noe Yañez RN  Outcome: Progressing     Problem: Safety - Adult  Goal: Free from fall injury  11/1/2024 0557 by Noe Yañez RN  Outcome: Progressing  10/31/2024 2357 by Noe Yañez RN  Outcome: Progressing  10/31/2024 2347 by Noe Yañez RN  Outcome: Progressing  10/31/2024 1723 by Michell Jimenez RN  Outcome: Progressing

## 2024-11-01 NOTE — PLAN OF CARE
Problem: Safety - Medical Restraint  Goal: Remains free of injury from restraints (Restraint for Interference with Medical Device)  Description: INTERVENTIONS:  1. Determine that other, less restrictive measures have been tried or would not be effective before applying the restraint  2. Evaluate the patient's condition at the time of restraint application  3. Inform patient/family regarding the reason for restraint  4. Q2H: Monitor safety, psychosocial status, comfort, nutrition and hydration  11/1/2024 1046 by Richie Ocampo RN  Outcome: Completed  Flowsheets (Taken 11/1/2024 1000 by Michell Jimenez RN)  Remains free of injury from restraints (restraint for interference with medical device):   Evaluate the patient's condition at the time of restraint application   Inform patient/family regarding the reason for restraint  11/1/2024 0803 by Michell Jimenez RN  Outcome: Progressing  11/1/2024 0557 by Noe Yañez RN  Outcome: Progressing  10/31/2024 2357 by Noe Yañez RN  Outcome: Progressing  10/31/2024 2347 by Noe Yañez RN  Outcome: Progressing

## 2024-11-01 NOTE — PROGRESS NOTES
OCCUPATIONAL THERAPY INITIAL EVALUATION    [facility]  [address], [place]      Date:2024                                                  Patient Name: Andres Aviles  MRN: [id number]  : 1950  Room: [address] [place] [address]    Evaluating OT: Ksenia Arnold MOT, OTR/L  # [id number]    Referring Provider:  Lesly Rice MD   Specific Provider Orders:  \"OT Eval and Treat\"  10-30-24    Diagnosis: Hypoxemia [R09.02]  Trauma [T14.90XA]  Fall from ladder, initial encounter [W11.XXXA]  Closed fracture of multiple ribs, unspecified laterality, initial encounter [S22.49XA]  Compression fracture of body of thoracic vertebra (HCC) [S22.000A]    Pt was admitted after falling ~ 10' off of a ladder onto a concrete floor.  Sustained Compression Fracture T9, L2, Fracture of Transverse Process of L1 and Right Ribs 6-8, Scalp Laceration  Hx of Dementia    Pertinent Medical History:  Pt has a past medical history of Arthritis and Cerebral artery occlusion with cerebral infarction (HCC).,  has a past surgical history that includes hernia repair.    Surgeries this admission: None     Precautions:  Fall Risk  Cognition -   Spinal Precautions, TLSO when upright    Assessment of current deficits   [x] Functional mobility  [x]ADLs  [x] Strength               [x]Cognition   [x] Functional transfers   [x] IADLs         [x] Safety Awareness   [x]Endurance   [] Fine Coordination              [x] Balance     [] Vision/perception   []Sensation    []Gross Motor Coordination  [] ROM  [] Delirium                  [] Motor Control       OT PLAN OF CARE   OT POC based on physician orders, patient diagnosis and results of clinical assessment    Frequency/Duration 1-3 days/wk for 2 weeks PRN   Specific OT Treatment to include:   * Instruction/training on adapted ADL techniques and AE recommendations to increase functional independence within precautions       *  information on past medical history/social history and prior level of function, completion of standardized testing/informal observation of tasks, assessment of data and education on plan of care and goals.            Ksenia Arnold, MOT, OTR/L  # [id number]

## 2024-11-01 NOTE — DISCHARGE INSTR - COC
Continuity of Care Form    Patient Name: Andres Aviles   :  1950  MRN:  [id number]    Admit date:  10/30/2024  Discharge date:  ***    Code Status Order: Full Code   Advance Directives:   Advance Care Flowsheet Documentation             Admitting Physician:  Marisol Cooper MD  PCP: Carissa De Leon APRN - CNP    Discharging Nurse: ***  Discharging Hospital Unit/Room#: 4505/4505-B  Discharging Unit Phone Number: ***    Emergency Contact:   Extended Emergency Contact Information  Primary Emergency Contact: Shruti Aviles  Home Phone: [phone]  Mobile Phone: [phone]  Relation: Spouse  Preferred language: English   needed? No    Past Surgical History:  Past Surgical History:   Procedure Laterality Date    HERNIA REPAIR         Immunization History:   Immunization History   Administered Date(s) Administered    TD 5LF, TENIVAC, (age 7y+), IM, 0.5mL 10/30/2024       Active Problems:  Patient Active Problem List   Diagnosis Code    Trauma T14.90XA    Accidental fall from ladder W11.XXXA       Isolation/Infection:   Isolation            No Isolation          Patient Infection Status       None to display            Nurse Assessment:  Last Vital Signs: /70   Pulse 88   Temp 97.5 °F (36.4 °C) (Temporal)   Resp 23   Ht 1.702 m (5' 7\")   Wt 71.7 kg (158 lb 1.1 oz)   SpO2 93%   BMI 24.76 kg/m²     Last documented pain score (0-10 scale): Pain Level: 3  Last Weight:   Wt Readings from Last 1 Encounters:   24 71.7 kg (158 lb 1.1 oz)     Mental Status:  {IP PT MENTAL STATUS:}    IV Access:  { POOJA IV ACCESS:698172229}    Nursing Mobility/ADLs:  Walking   {CHP DME ADLs:390586983}  Transfer  {CHP DME ADLs:680051784}  Bathing  {CHP DME ADLs:651392477}  Dressing  {CHP DME ADLs:612162402}  Toileting  {CHP DME ADLs:061889252}  Feeding  {CHP DME ADLs:533632037}  Med Admin  {CHP DME ADLs:698947798}  Med Delivery   { POOJA MED Delivery:900863397}    Wound Care Documentation and  Therapy:  Wound 10/31/24 Head Posterior 8 sutures , head trauma (Active)   Wound Etiology Traumatic 10/31/24 2002   Wound Cleansed Cleansed with saline 10/31/24 2002   Dressing/Treatment Pharmaceutical agent (see MAR) 10/31/24 2002   Drainage Amount Small (< 25%) 10/31/24 2002   Drainage Description Serosanguinous 10/31/24 2002   Odor None 10/31/24 2002   Number of days: 0       Wound 10/31/24 Pretibial Right (Active)   Wound Etiology Traumatic 10/31/24 2002   Dressing/Treatment Open to air 10/31/24 2002   Drainage Amount None (dry) 10/31/24 2002   Odor None 10/31/24 2002   Number of days: 0        Elimination:  Continence:   Bowel: {YES / NO:}  Bladder: {YES / NO:}  Urinary Catheter: {Urinary Catheter:008102942}   Colostomy/Ileostomy/Ileal Conduit: {YES / NO:}       Date of Last BM: ***    Intake/Output Summary (Last 24 hours) at 2024 1406  Last data filed at 2024 0547  Gross per 24 hour   Intake 480 ml   Output 200 ml   Net 280 ml     I/O last 3 completed shifts:  In: 480 [P.O.:480]  Out: 200 [Urine:200]    Safety Concerns:     { POOJA Safety Concerns:144351278}    Impairments/Disabilities:      { POOJA Impairments/Disabilities:910343772}    Nutrition Therapy:  Current Nutrition Therapy:   { POOJA Diet List:944367269}    Routes of Feeding: {P DME Other Feedings:298270119}  Liquids: {Slp liquid thickness:49295}  Daily Fluid Restriction: {CHP DME Yes amt example:549576746}  Last Modified Barium Swallow with Video (Video Swallowing Test): {Done Not Done Date:}    Treatments at the Time of Hospital Discharge:   Respiratory Treatments: ***  Oxygen Therapy:  {Therapy; copd oxygen:88457}  Ventilator:    { CC Vent List:251230726}    Rehab Therapies: {THERAPEUTIC INTERVENTION:5974070620}  Weight Bearing Status/Restrictions: { CC Weight Bearin}  Other Medical Equipment (for information only, NOT a DME order):  {EQUIPMENT:559203913}  Other Treatments: ***    Patient's

## 2024-11-01 NOTE — PROGRESS NOTES
Voicemail left for patient's Spouse, Shruti regarding patient difficult night and need for nonviolent wrist restraints.

## 2024-11-01 NOTE — PROGRESS NOTES
TRAUMA SURGERY  DAILY PROGRESS NOTE    Patient's Name/Date of Birth: Andres Aviles / 1950    Date: 2024     Chief Complaint   Patient presents with    Trauma     FALL FROM 10 FT ONTO HEAD -LOC -BT         Subjective:  Pt in bed. Was aggressive overnight requiring haldol and restraints for his safety and safety of staff. He answers questions appropriately and follows commands however appears to be trying to get out of bed.      Objective:  Last 24Hrs  Temp  Av °F (36.7 °C)  Min: 97.2 °F (36.2 °C)  Max: 98.6 °F (37 °C)  Resp  Av.4  Min: 17  Max: 22  Pulse  Av  Min: 64  Max: 85  Systolic (24hrs), Av , Min:119 , Max:171     Diastolic (24hrs), Av, Min:59, Max:80    SpO2  Av %  Min: 92 %  Max: 97 %    I/O last 3 completed shifts:  In: 480 [P.O.:480]  Out: 200 [Urine:200]      General: lying in bed, appears to be trying to get out of bed however restraints present  Cardiovascular: Warm throughout, no edema. Minimal tenderness to right lateral chest wall  Respiratory: no respiratory distress, equal chest rise  Abdomen: soft,  nontender, nondistended  Skin: no obvious rashes or lesions appreciated, no jaundice  Extremities: moving all extremities, in restraints      CBC  Recent Labs     10/30/24  1633 10/31/24  0708   WBC 17.3* 7.0   RBC 4.20 3.36*   HGB 11.5* 9.1*   HCT 36.5* 29.4*   MCV 86.9 87.5   MCH 27.4 27.1   MCHC 31.5* 31.0*   RDW 17.0* 17.1*    140   MPV 9.4 10.2       CMP  Recent Labs     10/30/24  1633 10/30/24  1635 10/31/24  0708     --  137   K 4.1 4.04 4.1     --  105   CO2 24  --  23   BUN 15  --  20   CREATININE 0.9  --  0.7   GLUCOSE 110*  --  102*   CALCIUM 9.1  --  8.3*   BILITOT 1.6*  --   --    ALKPHOS 131*  --   --    AST 46*  --   --    ALT 25  --   --          Assessment/Plan:    Patient Active Problem List   Diagnosis    Trauma       74 y.o. male s/p fall with T9/L2 compression fx, L1 TP fx, R rib 6-8 fx,scalp lac s/p rpr     - depokote

## 2024-11-01 NOTE — PROGRESS NOTES
Soft bilateral wrist restraints Continued to maintain patient safety. Patient continues to have poor safety awareness and increased risk for further injury with patients actions.  Patient is pulling and removing lines, medical monitoring/ telemetry monitor, attempting to grab and bite Nurses assisting at bedside.  Patient increasingly more verbally  and physically aggressive. Attempts to redirect and provide patient education regarding plan of care and importance of maintaining his safety have been unsuccessful at this time and have been unable to redirect the patient. Will continue to monitor closely, provide patient education and evaluate continued need for bilateral wrist restraints.

## 2024-11-01 NOTE — CARE COORDINATION
Transition of Care: Discharge order noted. Met with pt and family at bedside to discuss transition of care. Pt lives with his spouse in a 1 story home with 1 bety. His son and daughter law live next door. Daughter in law is a nurses aid. Ambulated with no assistive device. He has a fww if needed. Independent with adl's. PCP Carissa De Leon CNP. Pharmacy Meds to Beds. Pt agreeable to [facility] for PT/OT. No preference in company. Referral called to [facility], awaiting acceptance. Family will transport home (TF)    JAGDISH Anderson,RN  Case Management  [phone]            Case Management Assessment  Initial Evaluation    Date/Time of Evaluation: 11/1/2024 2:49 PM  Assessment Completed by: BLANCHE CRAIG RN    If patient is discharged prior to next notation, then this note serves as note for discharge by case management.    Patient Name: Andres Aviles                   YOB: 1950  Diagnosis: Hypoxemia [R09.02]  Trauma [T14.90XA]  Fall from ladder, initial encounter [W11.XXXA]  Closed fracture of multiple ribs, unspecified laterality, initial encounter [S22.49XA]  Compression fracture of body of thoracic vertebra (HCC) [S22.000A]                   Date / Time: 10/30/2024  4:22 PM    Patient Admission Status: Inpatient   Readmission Risk (Low < 19, Mod (19-27), High > 27): Readmission Risk Score: 13.2    Current PCP: Carissa De Leon APRN - CNP  PCP verified by CM? Yes    Chart Reviewed: Yes      History Provided by: Patient  Patient Orientation: Alert and Oriented    Patient Cognition: Alert    Hospitalization in the last 30 days (Readmission):  No    If yes, Readmission Assessment in  Navigator will be completed.    Advance Directives:      Code Status: Full Code   Patient's Primary Decision Maker is: Legal Next of Kin    Primary Decision Maker: Shruti Aviles - Spouse - [phone]    Discharge Planning:    Patient lives with: Spouse/Significant Other Type of Home: House  Primary Care Giver: Self  Patient Support

## 2024-11-01 NOTE — PROGRESS NOTES
Physical Therapy    Physical Therapy Initial Assessment     Name: Andres Aviles  : 1950  MRN: [id number]      Date of Service: 2024    Evaluating PT:  Rose Thorne, PT, DPT [id number]    Room #:  4505/4505-B  Diagnosis:  Hypoxemia [R09.02]  Trauma [T14.90XA]  Fall from ladder, initial encounter [W11.XXXA]  Closed fracture of multiple ribs, unspecified laterality, initial encounter [S22.49XA]  Compression fracture of body of thoracic vertebra (HCC) [S22.000A]  PMHx/PSHx:   has a past medical history of Arthritis and Cerebral artery occlusion with cerebral infarction (HCC).  Procedure/Surgery:  None this admission  Precautions:  Fall risk, alarms, Hx of dementia, cognition, TLSO s/p T9/L2 compression fx, L1 TP fx, R rib 6-8 Fx, scalp lac,   Equipment Needs:  TBD    SUBJECTIVE:    Pt lives with his wife in a 1 story home with 1+1 step to enter and 1 rail(s). Pt's son and daughter-in-law live next door. Pt ambulated with no device PTA. He owns a FWW.    OBJECTIVE:   Initial Evaluation  Date: 24 Treatment Date:  Short Term/ Long Term   Goals   AM-PAC 6 Clicks      Was pt agreeable to Eval/treatment? Yes     Does pt have pain? Mild LBP     Bed Mobility  Rolling: NT  Supine to sit: NT  Sit to supine: NT  Scooting: SBA  Rolling: Independent  Supine to sit: Independent  Sit to supine: Independent  Scooting: Independent   Transfers Sit to stand: Zia  Stand to sit: Zia  Stand pivot: Zia with FWW  Sit to stand: Independent  Stand to sit: Independent  Stand pivot: Mod I with AAD   Ambulation    300 feet with FWW and Zia  >600 feet with AAD and Mod I   Stair negotiation: ascended and descended  NT  >1 steps with 1 rail and Mod I   ROM BUE:  Refer to OT  BLE:  WFL     Strength BUE:  Refer to OT  BLE:  Not formally assessed     Balance Sitting EOB:  NT  Dynamic Standing:  Zia with FWW  Sitting EOB:  Independent  Dynamic Standing:  Mod I with AAD     Pt is A & O x 3 (pt was oriented to self,

## 2024-11-01 NOTE — PROGRESS NOTES
Neurosurg progress note  VITALS:  /70   Pulse 88   Temp 97.5 °F (36.4 °C) (Temporal)   Resp 23   Ht 1.702 m (5' 7\")   Wt 71.7 kg (158 lb 1.1 oz)   SpO2 93%   BMI 24.76 kg/m²   24HR INTAKE/OUTPUT:    Intake/Output Summary (Last 24 hours) at 11/1/2024 1437  Last data filed at 11/1/2024 0547  Gross per 24 hour   Intake 480 ml   Output 200 ml   Net 280 ml     CT HEAD WO CONTRAST    Result Date: 10/31/2024  EXAMINATION: CT OF THE HEAD WITHOUT CONTRAST  10/31/2024 11:17 am TECHNIQUE: CT of the head was performed without the administration of intravenous contrast. Automated exposure control, iterative reconstruction, and/or weight based adjustment of the mA/kV was utilized to reduce the radiation dose to as low as reasonably achievable. COMPARISON: CT brain, 230, 2024. HISTORY: ORDERING SYSTEM PROVIDED HISTORY: altered mental status TECHNOLOGIST PROVIDED HISTORY: Has a \"code stroke\" or \"stroke alert\" been called?->No Reason for exam:->altered mental status What reading provider will be dictating this exam?->CRC FINDINGS: BRAIN/VENTRICLES: There is no acute intracranial hemorrhage, mass effect or midline shift.  No abnormal extra-axial fluid collection.  The gray-white differentiation is maintained without evidence of an acute infarct.  There is no evidence of hydrocephalus. Ventricles mildly enlarged with sulci mildly prominent. ORBITS: The visualized portion of the orbits demonstrate no acute abnormality. SINUSES: The visualized paranasal sinuses and mastoid air cells demonstrate minimal mucosal thickening left maxillary sinus.  Congenital AP lesion right frontal sinus.  Mastoid air cells well pneumatized. SOFT TISSUES/SKULL:  Extra calvarial right posterior parietal scalp hematoma extending cephalad to vertex is again identified.  A more focal acute 1.9 x 1.4 cm scalp hematoma is found within the right posterior parietal region near the vertex.     Acute on chronic right posterior parietal scalp hematoma.

## 2024-11-01 NOTE — PLAN OF CARE
Problem: Safety - Medical Restraint  Goal: Remains free of injury from restraints (Restraint for Interference with Medical Device)  Description: INTERVENTIONS:  1. Determine that other, less restrictive measures have been tried or would not be effective before applying the restraint  2. Evaluate the patient's condition at the time of restraint application  3. Inform patient/family regarding the reason for restraint  4. Q2H: Monitor safety, psychosocial status, comfort, nutrition and hydration     Problem: Safety - Adult  Goal: Free from fall injury

## 2025-07-09 ENCOUNTER — HOSPITAL ENCOUNTER (INPATIENT)
Age: 75
LOS: 2 days | Discharge: HOME HEALTH | DRG: 481 | End: 2025-07-11
Attending: EMERGENCY MEDICINE | Admitting: STUDENT IN AN ORGANIZED HEALTH CARE EDUCATION/TRAINING PROGRAM
Payer: MEDICARE

## 2025-07-09 ENCOUNTER — APPOINTMENT (OUTPATIENT)
Dept: CT IMAGING | Age: 75
DRG: 481 | End: 2025-07-09
Payer: MEDICARE

## 2025-07-09 ENCOUNTER — APPOINTMENT (OUTPATIENT)
Dept: GENERAL RADIOLOGY | Age: 75
DRG: 481 | End: 2025-07-09
Payer: MEDICARE

## 2025-07-09 ENCOUNTER — OFFICE VISIT (OUTPATIENT)
Dept: ORTHOPEDIC SURGERY | Age: 75
End: 2025-07-09
Payer: MEDICARE

## 2025-07-09 VITALS — BODY MASS INDEX: 25.9 KG/M2 | WEIGHT: 165 LBS | HEIGHT: 67 IN

## 2025-07-09 DIAGNOSIS — S76.011A STRAIN OF RIGHT HIP, INITIAL ENCOUNTER: Primary | ICD-10-CM

## 2025-07-09 DIAGNOSIS — M16.11 PRIMARY OSTEOARTHRITIS OF RIGHT HIP: ICD-10-CM
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
## 2025-07-09 DIAGNOSIS — S72.001A CLOSED FRACTURE OF RIGHT HIP, INITIAL ENCOUNTER (HCC): Primary | ICD-10-CM

## 2025-07-09 DIAGNOSIS — M25.551 HIP PAIN, RIGHT: ICD-10-CM

## 2025-07-09 DIAGNOSIS — M25.561 ACUTE PAIN OF RIGHT KNEE: ICD-10-CM

## 2025-07-09 DIAGNOSIS — S72.144A CLOSED NONDISPLACED INTERTROCHANTERIC FRACTURE OF RIGHT FEMUR, INITIAL ENCOUNTER (HCC): ICD-10-CM

## 2025-07-09 PROBLEM — S72.141A INTERTROCHANTERIC FRACTURE OF RIGHT FEMUR, CLOSED, INITIAL ENCOUNTER (HCC): Status: ACTIVE | Noted: 2025-07-09

## 2025-07-09 LAB
ABO + RH BLD: NORMAL
ALBUMIN SERPL-MCNC: 4.5 G/DL (ref 3.5–5.2)
ALP SERPL-CCNC: 189 U/L (ref 40–129)
ALT SERPL-CCNC: 15 U/L (ref 0–50)
ANION GAP SERPL CALCULATED.3IONS-SCNC: 14 MMOL/L (ref 7–16)
ARM BAND NUMBER: NORMAL
AST SERPL-CCNC: 23 U/L (ref 0–50)
BASOPHILS # BLD: 0.07 K/UL (ref 0–0.2)
BASOPHILS NFR BLD: 1 % (ref 0–2)
BILIRUB SERPL-MCNC: 1.2 MG/DL (ref 0–1.2)
BLOOD BANK SAMPLE EXPIRATION: NORMAL
BLOOD GROUP ANTIBODIES SERPL: NEGATIVE
BUN SERPL-MCNC: 19 MG/DL (ref 8–23)
CALCIUM SERPL-MCNC: 9.8 MG/DL (ref 8.8–10.2)
CHLORIDE SERPL-SCNC: 98 MMOL/L (ref 98–107)
CO2 SERPL-SCNC: 25 MMOL/L (ref 22–29)
CREAT SERPL-MCNC: 0.8 MG/DL (ref 0.7–1.2)
EOSINOPHIL # BLD: 0.29 K/UL (ref 0.05–0.5)
EOSINOPHILS RELATIVE PERCENT: 3 % (ref 0–6)
ERYTHROCYTE [DISTWIDTH] IN BLOOD BY AUTOMATED COUNT: 16.1 % (ref 11.5–15)
GFR, ESTIMATED: >90 ML/MIN/1.73M2
GLUCOSE SERPL-MCNC: 152 MG/DL (ref 74–99)
HCT VFR BLD AUTO: 42.4 % (ref 37–54)
HGB BLD-MCNC: 12.6 G/DL (ref 12.5–16.5)
IMM GRANULOCYTES # BLD AUTO: <0.03 K/UL (ref 0–0.58)
IMM GRANULOCYTES NFR BLD: 0 % (ref 0–5)
INR PPP: 1.1
LYMPHOCYTES NFR BLD: 1.5 K/UL (ref 1.5–4)
LYMPHOCYTES RELATIVE PERCENT: 16 % (ref 20–42)
MCH RBC QN AUTO: 26.1 PG (ref 26–35)
MCHC RBC AUTO-ENTMCNC: 29.7 G/DL (ref 32–34.5)
MCV RBC AUTO: 88 FL (ref 80–99.9)
MONOCYTES NFR BLD: 0.68 K/UL (ref 0.1–0.95)
MONOCYTES NFR BLD: 7 % (ref 2–12)
NEUTROPHILS NFR BLD: 72 % (ref 43–80)
NEUTS SEG NFR BLD: 6.6 K/UL (ref 1.8–7.3)
PLATELET # BLD AUTO: 286 K/UL (ref 130–450)
PMV BLD AUTO: 9.4 FL (ref 7–12)
POTASSIUM SERPL-SCNC: 4.1 MMOL/L (ref 3.5–5.1)
PROT SERPL-MCNC: 7.7 G/DL (ref 6.4–8.3)
PROTHROMBIN TIME: 12 SEC (ref 9.3–12.4)
RBC # BLD AUTO: 4.82 M/UL (ref 3.8–5.8)
SODIUM SERPL-SCNC: 137 MMOL/L (ref 136–145)
WBC OTHER # BLD: 9.2 K/UL (ref 4.5–11.5)

## 2025-07-09 PROCEDURE — 73700 CT LOWER EXTREMITY W/O DYE: CPT

## 2025-07-09 PROCEDURE — 1123F ACP DISCUSS/DSCN MKR DOCD: CPT | Performed by: PHYSICIAN ASSISTANT

## 2025-07-09 PROCEDURE — 86901 BLOOD TYPING SEROLOGIC RH(D): CPT

## 2025-07-09 PROCEDURE — 6370000000 HC RX 637 (ALT 250 FOR IP): Performed by: STUDENT IN AN ORGANIZED HEALTH CARE EDUCATION/TRAINING PROGRAM

## 2025-07-09 PROCEDURE — 86900 BLOOD TYPING SEROLOGIC ABO: CPT

## 2025-07-09 PROCEDURE — 99285 EMERGENCY DEPT VISIT HI MDM: CPT

## 2025-07-09 PROCEDURE — 2580000003 HC RX 258: Performed by: STUDENT IN AN ORGANIZED HEALTH CARE EDUCATION/TRAINING PROGRAM

## 2025-07-09 PROCEDURE — 80053 COMPREHEN METABOLIC PANEL: CPT

## 2025-07-09 PROCEDURE — 2060000000 HC ICU INTERMEDIATE R&B

## 2025-07-09 PROCEDURE — 73562 X-RAY EXAM OF KNEE 3: CPT

## 2025-07-09 PROCEDURE — 6370000000 HC RX 637 (ALT 250 FOR IP)

## 2025-07-09 PROCEDURE — 71045 X-RAY EXAM CHEST 1 VIEW: CPT

## 2025-07-09 PROCEDURE — 1125F AMNT PAIN NOTED PAIN PRSNT: CPT | Performed by: PHYSICIAN ASSISTANT

## 2025-07-09 PROCEDURE — G8428 CUR MEDS NOT DOCUMENT: HCPCS | Performed by: PHYSICIAN ASSISTANT

## 2025-07-09 PROCEDURE — 1200000000 HC SEMI PRIVATE

## 2025-07-09 PROCEDURE — 99222 1ST HOSP IP/OBS MODERATE 55: CPT | Performed by: STUDENT IN AN ORGANIZED HEALTH CARE EDUCATION/TRAINING PROGRAM

## 2025-07-09 PROCEDURE — 99203 OFFICE O/P NEW LOW 30 MIN: CPT | Performed by: PHYSICIAN ASSISTANT

## 2025-07-09 PROCEDURE — 85610 PROTHROMBIN TIME: CPT

## 2025-07-09 PROCEDURE — 72125 CT NECK SPINE W/O DYE: CPT

## 2025-07-09 PROCEDURE — 87081 CULTURE SCREEN ONLY: CPT

## 2025-07-09 PROCEDURE — G8419 CALC BMI OUT NRM PARAM NOF/U: HCPCS | Performed by: PHYSICIAN ASSISTANT

## 2025-07-09 PROCEDURE — 85025 COMPLETE CBC W/AUTO DIFF WBC: CPT

## 2025-07-09 PROCEDURE — 3017F COLORECTAL CA SCREEN DOC REV: CPT | Performed by: PHYSICIAN ASSISTANT

## 2025-07-09 PROCEDURE — 93005 ELECTROCARDIOGRAM TRACING: CPT | Performed by: EMERGENCY MEDICINE

## 2025-07-09 PROCEDURE — 70450 CT HEAD/BRAIN W/O DYE: CPT

## 2025-07-09 PROCEDURE — 6360000002 HC RX W HCPCS: Performed by: STUDENT IN AN ORGANIZED HEALTH CARE EDUCATION/TRAINING PROGRAM

## 2025-07-09 PROCEDURE — 1036F TOBACCO NON-USER: CPT | Performed by: PHYSICIAN ASSISTANT

## 2025-07-09 PROCEDURE — 86850 RBC ANTIBODY SCREEN: CPT

## 2025-07-09 RX ORDER — POTASSIUM CHLORIDE 7.45 MG/ML
10 INJECTION INTRAVENOUS PRN
Status: DISCONTINUED | OUTPATIENT
Start: 2025-07-09 | End: 2025-07-11 | Stop reason: HOSPADM

## 2025-07-09 RX ORDER — CHOLECALCIFEROL (VITAMIN D3) 50 MCG
2000 TABLET ORAL DAILY
Status: DISCONTINUED | OUTPATIENT
Start: 2025-07-09 | End: 2025-07-11 | Stop reason: HOSPADM

## 2025-07-09 RX ORDER — ESCITALOPRAM OXALATE 20 MG/1
20 TABLET ORAL DAILY
COMMUNITY

## 2025-07-09 RX ORDER — MEMANTINE HYDROCHLORIDE 10 MG/1
10 TABLET ORAL 2 TIMES DAILY
Status: DISCONTINUED | OUTPATIENT
Start: 2025-07-09 | End: 2025-07-11 | Stop reason: HOSPADM

## 2025-07-09 RX ORDER — SODIUM CHLORIDE 9 MG/ML
INJECTION, SOLUTION INTRAVENOUS PRN
Status: DISCONTINUED | OUTPATIENT
Start: 2025-07-09 | End: 2025-07-10 | Stop reason: SDUPTHER

## 2025-07-09 RX ORDER — ONDANSETRON 4 MG/1
4 TABLET, ORALLY DISINTEGRATING ORAL EVERY 8 HOURS PRN
Status: DISCONTINUED | OUTPATIENT
Start: 2025-07-09 | End: 2025-07-11 | Stop reason: HOSPADM

## 2025-07-09 RX ORDER — ACETAMINOPHEN 500 MG
1000 TABLET ORAL ONCE
Status: COMPLETED | OUTPATIENT
Start: 2025-07-09 | End: 2025-07-09

## 2025-07-09 RX ORDER — ACETAMINOPHEN 650 MG/1
650 SUPPOSITORY RECTAL EVERY 6 HOURS PRN
Status: DISCONTINUED | OUTPATIENT
Start: 2025-07-09 | End: 2025-07-11 | Stop reason: HOSPADM

## 2025-07-09 RX ORDER — SODIUM CHLORIDE 9 MG/ML
INJECTION, SOLUTION INTRAVENOUS CONTINUOUS
Status: ACTIVE | OUTPATIENT
Start: 2025-07-09 | End: 2025-07-10

## 2025-07-09 RX ORDER — ENOXAPARIN SODIUM 100 MG/ML
40 INJECTION SUBCUTANEOUS DAILY
Status: DISCONTINUED | OUTPATIENT
Start: 2025-07-09 | End: 2025-07-11 | Stop reason: HOSPADM

## 2025-07-09 RX ORDER — LISINOPRIL 5 MG/1
10 TABLET ORAL DAILY
COMMUNITY

## 2025-07-09 RX ORDER — POLYETHYLENE GLYCOL 3350 17 G/17G
17 POWDER, FOR SOLUTION ORAL DAILY PRN
Status: DISCONTINUED | OUTPATIENT
Start: 2025-07-09 | End: 2025-07-11 | Stop reason: HOSPADM

## 2025-07-09 RX ORDER — SODIUM CHLORIDE 0.9 % (FLUSH) 0.9 %
5-40 SYRINGE (ML) INJECTION EVERY 12 HOURS SCHEDULED
Status: DISCONTINUED | OUTPATIENT
Start: 2025-07-09 | End: 2025-07-10 | Stop reason: SDUPTHER

## 2025-07-09 RX ORDER — SODIUM CHLORIDE 0.9 % (FLUSH) 0.9 %
5-40 SYRINGE (ML) INJECTION PRN
Status: DISCONTINUED | OUTPATIENT
Start: 2025-07-09 | End: 2025-07-10 | Stop reason: SDUPTHER

## 2025-07-09 RX ORDER — LISINOPRIL 10 MG/1
10 TABLET ORAL DAILY
Status: DISCONTINUED | OUTPATIENT
Start: 2025-07-09 | End: 2025-07-11 | Stop reason: HOSPADM

## 2025-07-09 RX ORDER — ACETAMINOPHEN 325 MG/1
650 TABLET ORAL EVERY 6 HOURS PRN
Status: DISCONTINUED | OUTPATIENT
Start: 2025-07-09 | End: 2025-07-11 | Stop reason: HOSPADM

## 2025-07-09 RX ORDER — OXYCODONE AND ACETAMINOPHEN 5; 325 MG/1; MG/1
1 TABLET ORAL EVERY 4 HOURS PRN
Refills: 0 | Status: DISCONTINUED | OUTPATIENT
Start: 2025-07-09 | End: 2025-07-11 | Stop reason: HOSPADM

## 2025-07-09 RX ORDER — ONDANSETRON 2 MG/ML
4 INJECTION INTRAMUSCULAR; INTRAVENOUS EVERY 6 HOURS PRN
Status: DISCONTINUED | OUTPATIENT
Start: 2025-07-09 | End: 2025-07-11 | Stop reason: HOSPADM

## 2025-07-09 RX ORDER — ESCITALOPRAM OXALATE 10 MG/1
20 TABLET ORAL DAILY
Status: DISCONTINUED | OUTPATIENT
Start: 2025-07-09 | End: 2025-07-11 | Stop reason: HOSPADM

## 2025-07-09 RX ORDER — POTASSIUM CHLORIDE 1500 MG/1
40 TABLET, EXTENDED RELEASE ORAL PRN
Status: DISCONTINUED | OUTPATIENT
Start: 2025-07-09 | End: 2025-07-11 | Stop reason: HOSPADM

## 2025-07-09 RX ORDER — MAGNESIUM SULFATE IN WATER 40 MG/ML
2000 INJECTION, SOLUTION INTRAVENOUS PRN
Status: DISCONTINUED | OUTPATIENT
Start: 2025-07-09 | End: 2025-07-11 | Stop reason: HOSPADM

## 2025-07-09 RX ADMIN — MEMANTINE HYDROCHLORIDE 10 MG: 10 TABLET, FILM COATED ORAL at 21:32

## 2025-07-09 RX ADMIN — SODIUM CHLORIDE: 0.9 INJECTION, SOLUTION INTRAVENOUS at 19:01

## 2025-07-09 RX ADMIN — ESCITALOPRAM OXALATE 20 MG: 10 TABLET ORAL at 18:52

## 2025-07-09 RX ADMIN — LISINOPRIL 10 MG: 10 TABLET ORAL at 18:53

## 2025-07-09 RX ADMIN — ENOXAPARIN SODIUM 40 MG: 100 INJECTION SUBCUTANEOUS at 18:52

## 2025-07-09 RX ADMIN — ACETAMINOPHEN 1000 MG: 500 TABLET ORAL at 14:11

## 2025-07-09 ASSESSMENT — PAIN DESCRIPTION - LOCATION: LOCATION: KNEE;LEG

## 2025-07-09 ASSESSMENT — PAIN DESCRIPTION - ORIENTATION: ORIENTATION: RIGHT

## 2025-07-09 ASSESSMENT — ENCOUNTER SYMPTOMS
GASTROINTESTINAL NEGATIVE: 1
EYES NEGATIVE: 1
RESPIRATORY NEGATIVE: 1
ALLERGIC/IMMUNOLOGIC NEGATIVE: 1

## 2025-07-09 ASSESSMENT — PAIN DESCRIPTION - DESCRIPTORS: DESCRIPTORS: ACHING

## 2025-07-09 ASSESSMENT — LIFESTYLE VARIABLES
HOW OFTEN DO YOU HAVE A DRINK CONTAINING ALCOHOL: NEVER
HOW MANY STANDARD DRINKS CONTAINING ALCOHOL DO YOU HAVE ON A TYPICAL DAY: PATIENT DOES NOT DRINK

## 2025-07-09 ASSESSMENT — PAIN - FUNCTIONAL ASSESSMENT: PAIN_FUNCTIONAL_ASSESSMENT: 0-10

## 2025-07-09 ASSESSMENT — PAIN SCALES - GENERAL
PAINLEVEL_OUTOF10: 7
PAINLEVEL_OUTOF10: 0

## 2025-07-09 NOTE — PROGRESS NOTES
[facility]  New Patient Note      CHIEF COMPLAINT:   Chief Complaint   Patient presents with    Hip Pain     Pt presents today with c/o R hip pain x6 days after he fell off of a mower onto the R hip. States pain is in the hip joint. Patient is also having knee pain with extension. No previous injury to the hip and knee. Pt is unable to left the leg at the hip without manually moving the leg. Pt was taking Vicodin with little relief.       HISTORY OF PRESENT ILLNESS:                The patient is a 74 y.o. male who presents today with complaints of right hip pain x 6 days which began after he fell off of the mower onto the right hip.  The fall was not witnessed.  Patient is unsure if he hit his head or not.  His family is with him in the exam today.  Patient was initially seen at [facility] however only imaging of hip and knee were performed.  Pt has difficulty localizing the pain states is global throughout the joint.  Pt denies any numbness, tingling, loss of sensation or radiation of symptoms into toes.  Pain is worse with knee extension, any range of motion of the hip.  He is having difficulty with active hip flexion.  They have tried at home therapies of Vicodin for symptomatic relief.  Pt has never injured this hip or knee in the past.  .        Past Medical History:        Diagnosis Date    Arthritis     Cerebral artery occlusion with cerebral infarction (HCC)     Head injury 10/30/2024    History of cancer      Past Surgical History:        Procedure Laterality Date    HERNIA REPAIR       Current Medications:   No current facility-administered medications for this visit.  Allergies:  Patient has no known allergies.    Social History:   TOBACCO:   reports that he has never smoked. He has never used smokeless tobacco.  ETOH:   reports current alcohol use.  DRUGS:   reports no history of drug use.    Review of Systems   Constitutional: Negative for fever, chills,

## 2025-07-09 NOTE — ED NOTES
ED to Inpatient Handoff Report    Notified mariano that electronic handoff available and patient ready for transport to room 740.    Safety Risks: Risk of falls    Patient in Restraints: no    Constant Observer or Patient : no    Telemetry Monitoring Ordered :No           Order to transfer to unit without monitor:N/A    Last MEWS: 1 Time completed: 1757    Deterioration Index Score:   Predictive Model Details          20 (Normal)  Factor Value    Calculated 7/9/2025 17:58 68% Age 74 years old    Deterioration Index Model 19% Systolic 159     8% Pulse oximetry 100 %     4% Respiratory rate 17     1% Pulse 83     0% Temperature 98 °F (36.7 °C)        Vitals:    07/09/25 1251 07/09/25 1748 07/09/25 1757   BP: (!) 153/78 (!) 159/81 (!) 159/81   Pulse: 83  83   Resp: 17  17   Temp: 97.9 °F (36.6 °C)  98 °F (36.7 °C)   TempSrc: Oral     SpO2: 100%  100%         Opportunity for questions and clarification was provided.

## 2025-07-09 NOTE — H&P
[facility] History and Physical      CHIEF COMPLAINT:  Pain rt hip     History of Present Illness:  Patient is a 73 yo M with PMH significant for arthritis, anxiety/ depression, HTN, dementia who presented with rt hip pain from [facility] in [facility]. She had an alleged fall while pruning leaves and standing over a lawnmover. as initially seen at [facility] for this injury but only had hip and knee x-rays performed at that time. Reported difficulty to ambulate d/t restricted and painful movements at rt hip joint.Was found to have Mildly comminuted and essentially nondisplaced intertrochanteric fracture of the proximal right femur.  Underlying mild osteoarthritis of the right hip on Ct imaging.          Informant(s) for H&P: Patient and documentation    REVIEW OF SYSTEMS:  A comprehensive review of systems was negative except for: what is in the HPI      PMH:  Past Medical History:   Diagnosis Date    Arthritis     Cerebral artery occlusion with cerebral infarction (HCC)     Head injury 10/30/2024    History of cancer        Surgical History:  Past Surgical History:   Procedure Laterality Date    HERNIA REPAIR         Medications Prior to Admission:    Prior to Admission medications    Medication Sig Start Date End Date Taking? Authorizing Provider   lisinopril (PRINIVIL;ZESTRIL) 5 MG tablet Take 2 tablets by mouth daily    Paz Esquivel MD   escitalopram (LEXAPRO) 20 MG tablet Take 1 tablet by mouth daily    Paz Esquivel MD   aspirin 81 MG EC tablet Take 1 tablet by mouth every evening 11/8/24   Lesly Rice MD   Cholecalciferol (VITAMIN D3) 50 MCG (2000 UT) TABS Take 1 tablet by mouth daily    Paz Esquivel MD   memantine (NAMENDA) 10 MG tablet Take 1 tablet by mouth 2 times daily    Paz Esquivel MD       Allergies:    Patient has no known allergies.    Social History:    reports that he has never smoked. He has never

## 2025-07-09 NOTE — ED PROVIDER NOTES
[facility] EMERGENCY DEPARTMENT  EMERGENCY DEPARTMENT ENCOUNTER        Pt Name: Andres Aviles  MRN: [id number]  Birthdate 1950  Date of evaluation: 7/9/2025  Provider: SURI Cao CNP  PCP: Keli Carrero APRN - NP  Note Started: 12:50 PM EDT 7/9/25       I have seen and evaluated this patient with my supervising physician Santino Brooks MD.      CHIEF COMPLAINT       Chief Complaint   Patient presents with    Fall     Fall on Thursday, no LOC, takes daily asa. Hit head.     Knee Pain     Right sided.    Hip Pain     Right       HISTORY OF PRESENT ILLNESS: 1 or more Elements     History from : Patient    Limitations to history : None    Andres Aviles is a 74 y.o. male who presents to the ED with complaints of right hip pain and right knee pain since he had a fall on Thursday.  Patient states he was standing on his lawn tractor and was reaching to the side to cut some branches when he fell onto gravel.  Patient is unsure if he hit his head.  Patient denies loss of consciousness.  Patient denies use of blood thinners.  Patient does take aspirin daily.  Patient denies any numbness, tingling, weakness.  Patient states he is able to lift his leg but he does have pain.  Patient states his pain is increased with ambulation.  Patient denies any other injury.  Patient denies any chest pain, shortness of breath, abdominal pain, nausea, vomiting, diarrhea, constipation, urinary complaints, headache, lightheadedness, dizziness, fever, chills, body aches.  Patient denies any other symptoms.  Patient has other complaints at this time.    Nursing Notes were all reviewed and agreed with or any disagreements were addressed in the HPI.    REVIEW OF SYSTEMS :      Review of Systems   Constitutional: Negative.    HENT: Negative.     Eyes: Negative.    Respiratory: Negative.     Cardiovascular: Negative.    Gastrointestinal: Negative.    Endocrine: Negative.    Genitourinary: Negative.

## 2025-07-10 ENCOUNTER — ANESTHESIA (OUTPATIENT)
Dept: OPERATING ROOM | Age: 75
DRG: 481 | End: 2025-07-10
Payer: MEDICARE

## 2025-07-10 ENCOUNTER — ANESTHESIA EVENT (OUTPATIENT)
Dept: OPERATING ROOM | Age: 75
DRG: 481 | End: 2025-07-10
Payer: MEDICARE

## 2025-07-10 ENCOUNTER — APPOINTMENT (OUTPATIENT)
Dept: GENERAL RADIOLOGY | Age: 75
DRG: 481 | End: 2025-07-10
Payer: MEDICARE

## 2025-07-10 LAB
ALBUMIN SERPL-MCNC: 3.6 G/DL (ref 3.5–5.2)
ALP SERPL-CCNC: 145 U/L (ref 40–129)
ALT SERPL-CCNC: 7 U/L (ref 0–50)
ANION GAP SERPL CALCULATED.3IONS-SCNC: 10 MMOL/L (ref 7–16)
AST SERPL-CCNC: 19 U/L (ref 0–50)
BASOPHILS # BLD: 0.07 K/UL (ref 0–0.2)
BASOPHILS NFR BLD: 1 % (ref 0–2)
BILIRUB SERPL-MCNC: 1.5 MG/DL (ref 0–1.2)
BUN SERPL-MCNC: 15 MG/DL (ref 8–23)
CALCIUM SERPL-MCNC: 9.1 MG/DL (ref 8.8–10.2)
CHLORIDE SERPL-SCNC: 106 MMOL/L (ref 98–107)
CO2 SERPL-SCNC: 25 MMOL/L (ref 22–29)
CREAT SERPL-MCNC: 0.8 MG/DL (ref 0.7–1.2)
EKG ATRIAL RATE: 75 BPM
EKG P AXIS: 44 DEGREES
EKG P-R INTERVAL: 136 MS
EKG Q-T INTERVAL: 448 MS
EKG QRS DURATION: 92 MS
EKG QTC CALCULATION (BAZETT): 500 MS
EKG R AXIS: -6 DEGREES
EKG T AXIS: 42 DEGREES
EKG VENTRICULAR RATE: 75 BPM
EOSINOPHIL # BLD: 0.45 K/UL (ref 0.05–0.5)
EOSINOPHILS RELATIVE PERCENT: 8 % (ref 0–6)
ERYTHROCYTE [DISTWIDTH] IN BLOOD BY AUTOMATED COUNT: 16.1 % (ref 11.5–15)
GFR, ESTIMATED: >90 ML/MIN/1.73M2
GLUCOSE SERPL-MCNC: 106 MG/DL (ref 74–99)
HCT VFR BLD AUTO: 35.2 % (ref 37–54)
HGB BLD-MCNC: 11 G/DL (ref 12.5–16.5)
IMM GRANULOCYTES # BLD AUTO: <0.03 K/UL (ref 0–0.58)
IMM GRANULOCYTES NFR BLD: 0 % (ref 0–5)
LYMPHOCYTES NFR BLD: 1.62 K/UL (ref 1.5–4)
LYMPHOCYTES RELATIVE PERCENT: 27 % (ref 20–42)
MCH RBC QN AUTO: 26.6 PG (ref 26–35)
MCHC RBC AUTO-ENTMCNC: 31.3 G/DL (ref 32–34.5)
MCV RBC AUTO: 85 FL (ref 80–99.9)
MONOCYTES NFR BLD: 0.59 K/UL (ref 0.1–0.95)
MONOCYTES NFR BLD: 10 % (ref 2–12)
NEUTROPHILS NFR BLD: 54 % (ref 43–80)
NEUTS SEG NFR BLD: 3.2 K/UL (ref 1.8–7.3)
PLATELET # BLD AUTO: 221 K/UL (ref 130–450)
PMV BLD AUTO: 9 FL (ref 7–12)
POTASSIUM SERPL-SCNC: 4.6 MMOL/L (ref 3.5–5.1)
PROT SERPL-MCNC: 6.2 G/DL (ref 6.4–8.3)
RBC # BLD AUTO: 4.14 M/UL (ref 3.8–5.8)
SODIUM SERPL-SCNC: 142 MMOL/L (ref 136–145)
WBC OTHER # BLD: 5.9 K/UL (ref 4.5–11.5)

## 2025-07-10 PROCEDURE — 85025 COMPLETE CBC W/AUTO DIFF WBC: CPT

## 2025-07-10 PROCEDURE — 6360000002 HC RX W HCPCS: Performed by: STUDENT IN AN ORGANIZED HEALTH CARE EDUCATION/TRAINING PROGRAM

## 2025-07-10 PROCEDURE — 2580000003 HC RX 258: Performed by: NURSE ANESTHETIST, CERTIFIED REGISTERED

## 2025-07-10 PROCEDURE — 7100000000 HC PACU RECOVERY - FIRST 15 MIN: Performed by: STUDENT IN AN ORGANIZED HEALTH CARE EDUCATION/TRAINING PROGRAM

## 2025-07-10 PROCEDURE — 2500000003 HC RX 250 WO HCPCS: Performed by: STUDENT IN AN ORGANIZED HEALTH CARE EDUCATION/TRAINING PROGRAM

## 2025-07-10 PROCEDURE — 3600000014 HC SURGERY LEVEL 4 ADDTL 15MIN: Performed by: STUDENT IN AN ORGANIZED HEALTH CARE EDUCATION/TRAINING PROGRAM

## 2025-07-10 PROCEDURE — 3700000001 HC ADD 15 MINUTES (ANESTHESIA): Performed by: STUDENT IN AN ORGANIZED HEALTH CARE EDUCATION/TRAINING PROGRAM

## 2025-07-10 PROCEDURE — 3700000000 HC ANESTHESIA ATTENDED CARE: Performed by: STUDENT IN AN ORGANIZED HEALTH CARE EDUCATION/TRAINING PROGRAM

## 2025-07-10 PROCEDURE — 93010 ELECTROCARDIOGRAM REPORT: CPT | Performed by: INTERNAL MEDICINE

## 2025-07-10 PROCEDURE — 36415 COLL VENOUS BLD VENIPUNCTURE: CPT

## 2025-07-10 PROCEDURE — 99232 SBSQ HOSP IP/OBS MODERATE 35: CPT | Performed by: STUDENT IN AN ORGANIZED HEALTH CARE EDUCATION/TRAINING PROGRAM

## 2025-07-10 PROCEDURE — 80053 COMPREHEN METABOLIC PANEL: CPT

## 2025-07-10 PROCEDURE — 6370000000 HC RX 637 (ALT 250 FOR IP): Performed by: STUDENT IN AN ORGANIZED HEALTH CARE EDUCATION/TRAINING PROGRAM

## 2025-07-10 PROCEDURE — 3600000004 HC SURGERY LEVEL 4 BASE: Performed by: STUDENT IN AN ORGANIZED HEALTH CARE EDUCATION/TRAINING PROGRAM

## 2025-07-10 PROCEDURE — 6360000002 HC RX W HCPCS

## 2025-07-10 PROCEDURE — 2720000010 HC SURG SUPPLY STERILE: Performed by: STUDENT IN AN ORGANIZED HEALTH CARE EDUCATION/TRAINING PROGRAM

## 2025-07-10 PROCEDURE — 2580000003 HC RX 258: Performed by: STUDENT IN AN ORGANIZED HEALTH CARE EDUCATION/TRAINING PROGRAM

## 2025-07-10 PROCEDURE — 0QS606Z REPOSITION RIGHT UPPER FEMUR WITH INTRAMEDULLARY INTERNAL FIXATION DEVICE, OPEN APPROACH: ICD-10-PCS | Performed by: STUDENT IN AN ORGANIZED HEALTH CARE EDUCATION/TRAINING PROGRAM

## 2025-07-10 PROCEDURE — 7100000001 HC PACU RECOVERY - ADDTL 15 MIN: Performed by: STUDENT IN AN ORGANIZED HEALTH CARE EDUCATION/TRAINING PROGRAM

## 2025-07-10 PROCEDURE — 2500000003 HC RX 250 WO HCPCS: Performed by: NURSE ANESTHETIST, CERTIFIED REGISTERED

## 2025-07-10 PROCEDURE — 2500000003 HC RX 250 WO HCPCS

## 2025-07-10 PROCEDURE — C1713 ANCHOR/SCREW BN/BN,TIS/BN: HCPCS | Performed by: STUDENT IN AN ORGANIZED HEALTH CARE EDUCATION/TRAINING PROGRAM

## 2025-07-10 PROCEDURE — 2709999900 HC NON-CHARGEABLE SUPPLY: Performed by: STUDENT IN AN ORGANIZED HEALTH CARE EDUCATION/TRAINING PROGRAM

## 2025-07-10 PROCEDURE — 1200000000 HC SEMI PRIVATE

## 2025-07-10 RX ORDER — MORPHINE SULFATE 4 MG/ML
4 INJECTION, SOLUTION INTRAMUSCULAR; INTRAVENOUS
Status: DISCONTINUED | OUTPATIENT
Start: 2025-07-10 | End: 2025-07-11 | Stop reason: HOSPADM

## 2025-07-10 RX ORDER — SODIUM CHLORIDE 9 MG/ML
INJECTION, SOLUTION INTRAVENOUS PRN
Status: DISCONTINUED | OUTPATIENT
Start: 2025-07-10 | End: 2025-07-10

## 2025-07-10 RX ORDER — OXYCODONE HYDROCHLORIDE 5 MG/1
5 TABLET ORAL EVERY 6 HOURS PRN
Qty: 28 TABLET | Refills: 0 | Status: SHIPPED | OUTPATIENT
Start: 2025-07-10 | End: 2025-07-17

## 2025-07-10 RX ORDER — PROCHLORPERAZINE EDISYLATE 5 MG/ML
5 INJECTION INTRAMUSCULAR; INTRAVENOUS
Status: DISCONTINUED | OUTPATIENT
Start: 2025-07-10 | End: 2025-07-10 | Stop reason: HOSPADM

## 2025-07-10 RX ORDER — OXYCODONE HYDROCHLORIDE 5 MG/1
5 TABLET ORAL
Status: DISCONTINUED | OUTPATIENT
Start: 2025-07-10 | End: 2025-07-10

## 2025-07-10 RX ORDER — ROCURONIUM BROMIDE 10 MG/ML
INJECTION, SOLUTION INTRAVENOUS
Status: DISCONTINUED | OUTPATIENT
Start: 2025-07-10 | End: 2025-07-10 | Stop reason: SDUPTHER

## 2025-07-10 RX ORDER — SODIUM CHLORIDE 9 MG/ML
INJECTION, SOLUTION INTRAVENOUS CONTINUOUS
Status: DISCONTINUED | OUTPATIENT
Start: 2025-07-10 | End: 2025-07-11 | Stop reason: HOSPADM

## 2025-07-10 RX ORDER — DEXAMETHASONE SODIUM PHOSPHATE 4 MG/ML
INJECTION, SOLUTION INTRA-ARTICULAR; INTRALESIONAL; INTRAMUSCULAR; INTRAVENOUS; SOFT TISSUE
Status: DISCONTINUED | OUTPATIENT
Start: 2025-07-10 | End: 2025-07-10 | Stop reason: SDUPTHER

## 2025-07-10 RX ORDER — SODIUM CHLORIDE 0.9 % (FLUSH) 0.9 %
5-40 SYRINGE (ML) INJECTION EVERY 12 HOURS SCHEDULED
Status: DISCONTINUED | OUTPATIENT
Start: 2025-07-10 | End: 2025-07-11 | Stop reason: HOSPADM

## 2025-07-10 RX ORDER — PROPOFOL 10 MG/ML
INJECTION, EMULSION INTRAVENOUS
Status: DISCONTINUED | OUTPATIENT
Start: 2025-07-10 | End: 2025-07-10 | Stop reason: SDUPTHER

## 2025-07-10 RX ORDER — ONDANSETRON 2 MG/ML
INJECTION INTRAMUSCULAR; INTRAVENOUS
Status: DISCONTINUED | OUTPATIENT
Start: 2025-07-10 | End: 2025-07-10 | Stop reason: SDUPTHER

## 2025-07-10 RX ORDER — SODIUM CHLORIDE 0.9 % (FLUSH) 0.9 %
5-40 SYRINGE (ML) INJECTION EVERY 12 HOURS SCHEDULED
Status: DISCONTINUED | OUTPATIENT
Start: 2025-07-10 | End: 2025-07-10 | Stop reason: HOSPADM

## 2025-07-10 RX ORDER — SODIUM CHLORIDE 9 MG/ML
INJECTION, SOLUTION INTRAVENOUS PRN
Status: DISCONTINUED | OUTPATIENT
Start: 2025-07-10 | End: 2025-07-10 | Stop reason: HOSPADM

## 2025-07-10 RX ORDER — FENTANYL CITRATE 50 UG/ML
50 INJECTION, SOLUTION INTRAMUSCULAR; INTRAVENOUS EVERY 5 MIN PRN
Status: DISCONTINUED | OUTPATIENT
Start: 2025-07-10 | End: 2025-07-10

## 2025-07-10 RX ORDER — PROCHLORPERAZINE EDISYLATE 5 MG/ML
5 INJECTION INTRAMUSCULAR; INTRAVENOUS
Status: DISCONTINUED | OUTPATIENT
Start: 2025-07-10 | End: 2025-07-10

## 2025-07-10 RX ORDER — ASPIRIN 81 MG/1
81 TABLET, COATED ORAL 2 TIMES DAILY
Qty: 56 TABLET | Refills: 0 | Status: SHIPPED | OUTPATIENT
Start: 2025-07-10 | End: 2025-08-07

## 2025-07-10 RX ORDER — FENTANYL CITRATE 50 UG/ML
INJECTION, SOLUTION INTRAMUSCULAR; INTRAVENOUS
Status: DISCONTINUED | OUTPATIENT
Start: 2025-07-10 | End: 2025-07-10 | Stop reason: SDUPTHER

## 2025-07-10 RX ORDER — SODIUM CHLORIDE 0.9 % (FLUSH) 0.9 %
5-40 SYRINGE (ML) INJECTION EVERY 12 HOURS SCHEDULED
Status: DISCONTINUED | OUTPATIENT
Start: 2025-07-10 | End: 2025-07-10

## 2025-07-10 RX ORDER — TRANEXAMIC ACID 10 MG/ML
1000 INJECTION, SOLUTION INTRAVENOUS ONCE
Status: COMPLETED | OUTPATIENT
Start: 2025-07-10 | End: 2025-07-10

## 2025-07-10 RX ORDER — FENTANYL CITRATE 50 UG/ML
50 INJECTION, SOLUTION INTRAMUSCULAR; INTRAVENOUS EVERY 5 MIN PRN
Status: DISCONTINUED | OUTPATIENT
Start: 2025-07-10 | End: 2025-07-10 | Stop reason: HOSPADM

## 2025-07-10 RX ORDER — LIDOCAINE HYDROCHLORIDE 20 MG/ML
INJECTION, SOLUTION EPIDURAL; INFILTRATION; INTRACAUDAL; PERINEURAL
Status: DISCONTINUED | OUTPATIENT
Start: 2025-07-10 | End: 2025-07-10 | Stop reason: SDUPTHER

## 2025-07-10 RX ORDER — SODIUM CHLORIDE 9 MG/ML
INJECTION, SOLUTION INTRAVENOUS
Status: DISCONTINUED | OUTPATIENT
Start: 2025-07-10 | End: 2025-07-10 | Stop reason: SDUPTHER

## 2025-07-10 RX ORDER — SODIUM CHLORIDE 0.9 % (FLUSH) 0.9 %
5-40 SYRINGE (ML) INJECTION PRN
Status: DISCONTINUED | OUTPATIENT
Start: 2025-07-10 | End: 2025-07-10

## 2025-07-10 RX ORDER — OXYCODONE HYDROCHLORIDE 5 MG/1
5 TABLET ORAL
Status: DISCONTINUED | OUTPATIENT
Start: 2025-07-10 | End: 2025-07-10 | Stop reason: HOSPADM

## 2025-07-10 RX ORDER — SODIUM CHLORIDE 9 MG/ML
INJECTION, SOLUTION INTRAVENOUS PRN
Status: DISCONTINUED | OUTPATIENT
Start: 2025-07-10 | End: 2025-07-11 | Stop reason: HOSPADM

## 2025-07-10 RX ORDER — SODIUM CHLORIDE 0.9 % (FLUSH) 0.9 %
5-40 SYRINGE (ML) INJECTION PRN
Status: DISCONTINUED | OUTPATIENT
Start: 2025-07-10 | End: 2025-07-10 | Stop reason: HOSPADM

## 2025-07-10 RX ORDER — MORPHINE SULFATE 2 MG/ML
2 INJECTION, SOLUTION INTRAMUSCULAR; INTRAVENOUS
Status: DISCONTINUED | OUTPATIENT
Start: 2025-07-10 | End: 2025-07-11 | Stop reason: HOSPADM

## 2025-07-10 RX ORDER — SODIUM CHLORIDE 0.9 % (FLUSH) 0.9 %
5-40 SYRINGE (ML) INJECTION PRN
Status: DISCONTINUED | OUTPATIENT
Start: 2025-07-10 | End: 2025-07-11 | Stop reason: HOSPADM

## 2025-07-10 RX ADMIN — WATER 2000 MG: 1 INJECTION INTRAMUSCULAR; INTRAVENOUS; SUBCUTANEOUS at 23:43

## 2025-07-10 RX ADMIN — SUGAMMADEX 200 MG: 100 INJECTION, SOLUTION INTRAVENOUS at 17:12

## 2025-07-10 RX ADMIN — DEXAMETHASONE SODIUM PHOSPHATE 8 MG: 4 INJECTION, SOLUTION INTRAMUSCULAR; INTRAVENOUS at 16:27

## 2025-07-10 RX ADMIN — OXYCODONE AND ACETAMINOPHEN 1 TABLET: 5; 325 TABLET ORAL at 08:34

## 2025-07-10 RX ADMIN — MEMANTINE HYDROCHLORIDE 10 MG: 10 TABLET, FILM COATED ORAL at 20:01

## 2025-07-10 RX ADMIN — LISINOPRIL 10 MG: 10 TABLET ORAL at 08:31

## 2025-07-10 RX ADMIN — SODIUM CHLORIDE: 9 INJECTION, SOLUTION INTRAVENOUS at 15:44

## 2025-07-10 RX ADMIN — ONDANSETRON 4 MG: 2 INJECTION, SOLUTION INTRAMUSCULAR; INTRAVENOUS at 16:27

## 2025-07-10 RX ADMIN — MEMANTINE HYDROCHLORIDE 10 MG: 10 TABLET, FILM COATED ORAL at 08:31

## 2025-07-10 RX ADMIN — FENTANYL CITRATE 100 MCG: 50 INJECTION, SOLUTION INTRAMUSCULAR; INTRAVENOUS at 16:20

## 2025-07-10 RX ADMIN — WATER 2000 MG: 1 INJECTION INTRAMUSCULAR; INTRAVENOUS; SUBCUTANEOUS at 16:08

## 2025-07-10 RX ADMIN — SODIUM CHLORIDE: 0.9 INJECTION, SOLUTION INTRAVENOUS at 07:12

## 2025-07-10 RX ADMIN — TRANEXAMIC ACID 1000 MG: 10 INJECTION, SOLUTION INTRAVENOUS at 16:00

## 2025-07-10 RX ADMIN — LIDOCAINE HYDROCHLORIDE 100 MG: 20 INJECTION, SOLUTION EPIDURAL; INFILTRATION; INTRACAUDAL; PERINEURAL at 16:20

## 2025-07-10 RX ADMIN — OXYCODONE AND ACETAMINOPHEN 1 TABLET: 5; 325 TABLET ORAL at 00:54

## 2025-07-10 RX ADMIN — SODIUM CHLORIDE: 0.9 INJECTION, SOLUTION INTRAVENOUS at 18:53

## 2025-07-10 RX ADMIN — ESCITALOPRAM OXALATE 20 MG: 10 TABLET ORAL at 08:31

## 2025-07-10 RX ADMIN — PROPOFOL 160 MG: 10 INJECTION, EMULSION INTRAVENOUS at 16:20

## 2025-07-10 RX ADMIN — ROCURONIUM BROMIDE 35 MG: 10 INJECTION, SOLUTION INTRAVENOUS at 16:20

## 2025-07-10 ASSESSMENT — PAIN SCALES - GENERAL
PAINLEVEL_OUTOF10: 6
PAINLEVEL_OUTOF10: 0
PAINLEVEL_OUTOF10: 1
PAINLEVEL_OUTOF10: 2
PAINLEVEL_OUTOF10: 7
PAINLEVEL_OUTOF10: 7

## 2025-07-10 ASSESSMENT — PAIN - FUNCTIONAL ASSESSMENT
PAIN_FUNCTIONAL_ASSESSMENT: ACTIVITIES ARE NOT PREVENTED
PAIN_FUNCTIONAL_ASSESSMENT: NONE - DENIES PAIN
PAIN_FUNCTIONAL_ASSESSMENT: PREVENTS OR INTERFERES SOME ACTIVE ACTIVITIES AND ADLS
PAIN_FUNCTIONAL_ASSESSMENT: NONE - DENIES PAIN

## 2025-07-10 ASSESSMENT — PAIN DESCRIPTION - DESCRIPTORS
DESCRIPTORS: ACHING;CRUSHING;DISCOMFORT
DESCRIPTORS: ACHING;DISCOMFORT

## 2025-07-10 ASSESSMENT — PAIN DESCRIPTION - ORIENTATION
ORIENTATION: RIGHT
ORIENTATION: LEFT

## 2025-07-10 ASSESSMENT — PAIN DESCRIPTION - LOCATION
LOCATION: HIP
LOCATION: HIP

## 2025-07-10 ASSESSMENT — LIFESTYLE VARIABLES: SMOKING_STATUS: 0

## 2025-07-10 NOTE — ACP (ADVANCE CARE PLANNING)
Advance Care Planning   Healthcare Decision Maker:    Primary Decision Maker: Shruti Aviles - [facility] - [phone]    Click here to complete Healthcare Decision Makers including selection of the Healthcare Decision Maker Relationship (ie \"Primary\").

## 2025-07-10 NOTE — ANESTHESIA POSTPROCEDURE EVALUATION
Department of Anesthesiology  Postprocedure Note    Patient: Andres Aviles  MRN: [id number]  YOB: 1950  Date of evaluation: 7/10/2025    Procedure Summary       Date: 07/10/25 Room / Location: [address]    Anesthesia Start: 1605 Anesthesia Stop:     Procedure: RIGHT HIP INTRAMEDULLARY NAILING (Right: Hip) Diagnosis:       Closed nondisplaced intertrochanteric fracture of femur, unspecified laterality, initial encounter ([facility])      (Closed nondisplaced intertrochanteric fracture of femur, unspecified laterality, initial encounter ([facility]) [S72.146A])    Surgeons: Rahul Murrell DO Responsible Provider: Renetta Simon DO    Anesthesia Type: general ASA Status: 3            Anesthesia Type: No value filed.    Joya Phase I: Joya Score: 10    Joya Phase II:      Anesthesia Post Evaluation    Patient location during evaluation: PACU  Patient participation: waiting for patient participation  Level of consciousness: awake  Airway patency: patent  Nausea & Vomiting: no nausea and no vomiting  Cardiovascular status: hemodynamically stable  Respiratory status: acceptable  Hydration status: euvolemic  Pain management: adequate        No notable events documented.

## 2025-07-10 NOTE — DISCHARGE INSTRUCTIONS
[facility] Orthopedic Surgery  [address] , [address]  [place] [address]    Or    [address] [place] [address]    Dr. Rahul Murrell, DO    Orthopaedics Discharge Instructions   Weight bearing Status - Weight bearing as tolerated - on right lower Extremity  Pain medication Per Prescriptions  Contact Office for Medication Refill-  [phone]  Office can refill pain med every 7 days  If patient discharging to facility then pain control will be continued per facility physician  Ice to operative/injured site for 15-30 minutes of each hour for next 5 days    Recommend that you continue to ice the area 2-3 times per day after this   Elevate operative/injured limb on 2 pillows at home  Goal is to have limb above the heart if able  Continue DVT Prophylaxis (blood clot prevention) as Prescribed: Asprin 81 mg Twice a day  Wound care - Keep Aquacel dressing in place until postoperative day #7. Can just peel off.  If wound is clean & dry, may leave open to air. If not, keep covered with dry dressing until wound is dry   Follow Up in Office in 10-14 days. Call Office to Confirm Appointment time and Location - [phone]    Call the office at [phone] for directions or with any questions.  Watch for these significant complications.  Call physician if they or any other problems occur:  Fever over 101°, redness, swelling or warmth at the operative site  Unrelieved nausea    Foul smelling or cloudy drainage at the operative site   Unrelieved pain    Blood soaked dressing. (Some oozing may be normal)     Numb, pale, blue, cold or tingling extremity

## 2025-07-10 NOTE — PLAN OF CARE
Problem: Chronic Conditions and Co-morbidities  Goal: Patient's chronic conditions and co-morbidity symptoms are monitored and maintained or improved  7/10/2025 0140 by Shea Barry RN  Outcome: Progressing  7/9/2025 1902 by Dar Echols RN  Outcome: Progressing     Problem: Discharge Planning  Goal: Discharge to home or other facility with appropriate resources  7/10/2025 0140 by Shea Barry RN  Outcome: Progressing  Flowsheets (Taken 7/9/2025 2123)  Discharge to home or other facility with appropriate resources: Identify barriers to discharge with patient and caregiver  7/9/2025 1902 by Dar Echols RN  Outcome: Progressing     Problem: Pain  Goal: Verbalizes/displays adequate comfort level or baseline comfort level  7/10/2025 0140 by Shea Barry RN  Outcome: Progressing  7/9/2025 1902 by Dar Echols RN  Outcome: Progressing     Problem: Safety - Adult  Goal: Free from fall injury  Outcome: Progressing     Problem: Skin/Tissue Integrity  Goal: Skin integrity remains intact  Description: 1.  Monitor for areas of redness and/or skin breakdown  2.  Assess vascular access sites hourly  3.  Every 4-6 hours minimum:  Change oxygen saturation probe site  4.  Every 4-6 hours:  If on nasal continuous positive airway pressure, respiratory therapy assess nares and determine need for appliance change or resting period  Outcome: Progressing

## 2025-07-10 NOTE — PLAN OF CARE
Problem: Chronic Conditions and Co-morbidities  Goal: Patient's chronic conditions and co-morbidity symptoms are monitored and maintained or improved  7/10/2025 0839 by Veronica Ahmadi RN  Outcome: Progressing  7/10/2025 0140 by Shea Brary RN  Outcome: Progressing  7/9/2025 1902 by Dar Echols RN  Outcome: Progressing     Problem: Discharge Planning  Goal: Discharge to home or other facility with appropriate resources  7/10/2025 0839 by Veronica Ahmadi RN  Outcome: Progressing  7/10/2025 0140 by Shea Barry RN  Outcome: Progressing  Flowsheets (Taken 7/9/2025 2123)  Discharge to home or other facility with appropriate resources: Identify barriers to discharge with patient and caregiver  7/9/2025 1902 by Dar Echols RN  Outcome: Progressing     Problem: Pain  Goal: Verbalizes/displays adequate comfort level or baseline comfort level  7/10/2025 0839 by Veronica Ahmadi RN  Outcome: Progressing  7/10/2025 0140 by Shea Barry RN  Outcome: Progressing  7/9/2025 1902 by Dar Echols RN  Outcome: Progressing     Problem: Safety - Adult  Goal: Free from fall injury  7/10/2025 0839 by Veronica Ahmadi RN  Outcome: Progressing  7/10/2025 0140 by Shea Barry RN  Outcome: Progressing     Problem: Skin/Tissue Integrity  Goal: Skin integrity remains intact  Description: 1.  Monitor for areas of redness and/or skin breakdown  2.  Assess vascular access sites hourly  3.  Every 4-6 hours minimum:  Change oxygen saturation probe site  4.  Every 4-6 hours:  If on nasal continuous positive airway pressure, respiratory therapy assess nares and determine need for appliance change or resting period  7/10/2025 0839 by Veronica Ahmadi RN  Outcome: Progressing  7/10/2025 0140 by Shea Barry RN  Outcome: Progressing

## 2025-07-10 NOTE — CARE COORDINATION
Met with patient about diagnosis and discharge plan of care. Pt admit for right femur fracture after mechanical fall. Ortho conult, NPO for OR. Pt lives with spouse in ranch home. Has no DME. Follows with Keli Carrero CNP. Will follow post op and pending therapies. Will discuss planning-mjo

## 2025-07-10 NOTE — ANESTHESIA PRE PROCEDURE
Department of Anesthesiology  Preprocedure Note       Name:  Andres Aviles   Age:  74 y.o.  :  1950                                          MRN:  [id number]         Date:  7/10/2025      Surgeon: Surgeon(s):  Rahul Murrell DO    Procedure: Procedure(s):  RIGHT HIP INTRAMEDULLARY NAILING      ++ARTHREX++      ++AVAILABLE 1500++    Medications prior to admission:   Prior to Admission medications    Medication Sig Start Date End Date Taking? Authorizing Provider   lisinopril (PRINIVIL;ZESTRIL) 5 MG tablet Take 2 tablets by mouth daily   Yes Paz Esquivel MD   escitalopram (LEXAPRO) 20 MG tablet Take 1 tablet by mouth daily   Yes Pza Esquivel MD   aspirin 81 MG EC tablet Take 1 tablet by mouth every evening 24  Yes Lesly Rice MD   Cholecalciferol (VITAMIN D3) 50 MCG (2000) TABS Take 1 tablet by mouth daily   Yes Paz Esquivel MD   memantine (NAMENDA) 10 MG tablet Take 1 tablet by mouth 2 times daily   Yes Paz Esquivel MD       Current medications:    Current Facility-Administered Medications   Medication Dose Route Frequency Provider Last Rate Last Admin    sodium chloride flush 0.9 % injection 5-40 mL  5-40 mL IntraVENous 2 times per day Santino Brooks MD        sodium chloride flush 0.9 % injection 5-40 mL  5-40 mL IntraVENous PRN Santino Brooks MD        0.9 % sodium chloride infusion   IntraVENous PRN Santino Brooks MD        potassium chloride (KLOR-CON M) extended release tablet 40 mEq  40 mEq Oral PRN Santino Brooks MD        Or    potassium bicarb-citric acid (EFFER-K) effervescent tablet 40 mEq  40 mEq Oral PRN Santino Brooks MD        Or    potassium chloride 10 mEq/100 mL IVPB (Peripheral Line)  10 mEq IntraVENous PRN Santino Brooks MD        magnesium sulfate 2000 mg in 50 mL IVPB premix  2,000 mg IntraVENous PRN Santino Brooks MD        enoxaparin (LOVENOX) injection 40 mg  40 mg SubCUTAneous Daily Santino Brooks MD   40 mg at 25 0906

## 2025-07-10 NOTE — CONSULTS
negative for bleeding and petechiae  MUSCULOSKELETAL:  positive for  pain  NEUROLOGICAL:  negative for headaches, dizziness  BEHAVIOR/PSYCH:  negative for increased agitation and anxiety    PHYSICAL EXAM:    VITALS:  BP (!) 179/84   Pulse 66   Temp 97.1 °F (36.2 °C) (Tympanic)   Resp 16   Ht 1.702 m (5' 7.01\")   Wt 78.4 kg (172 lb 13.5 oz)   SpO2 93%   BMI 27.06 kg/m²   CONSTITUTIONAL:  awake, alert, cooperative, no apparent distress, and appears stated age  MUSCULOSKELETAL:  Right lower Extremity:  Shortened and externally rotated  +Log roll  + Heel Strike  -TTP over Knee and Ankle  Skin intact with no signs of degloving  Compartments soft and compressible, calf non-tender  +DP & PT pulses, Brisk Cap refill, Toes warm and perfused  Sensation grossly intact superficial/deep peroneal,saphenous,sural,tibial n. distributions  +GS/TA/EHL.     Secondary Exam:   bilateralUE: No obvious signs of trauma.  -TTP to fingers, hand, wrist, forearm, elbow, humerus, shoulder or clavicle.    leftLE: No obvious signs of trauma.   -TTP to foot, ankle, leg, knee, thigh, hip.      Pelvis: -TTP, -Log roll, -Heel strike     DATA:    CBC:   Lab Results   Component Value Date/Time    WBC 5.9 07/10/2025 05:13 AM    RBC 4.14 07/10/2025 05:13 AM    HGB 11.0 07/10/2025 05:13 AM    HCT 35.2 07/10/2025 05:13 AM    MCV 85.0 07/10/2025 05:13 AM    MCH 26.6 07/10/2025 05:13 AM    MCHC 31.3 07/10/2025 05:13 AM    RDW 16.1 07/10/2025 05:13 AM     07/10/2025 05:13 AM    MPV 9.0 07/10/2025 05:13 AM     PT/INR:    Lab Results   Component Value Date/Time    PROTIME 12.0 07/09/2025 05:21 PM    INR 1.1 07/09/2025 05:21 PM     Lactic Acid :   Lab Results   Component Value Date/Time    LACTA 1.4 10/30/2024 04:33 PM       Radiology Review:  07/10/25 - XR right hip and CT right hip  Demonstrate a minimally displaced comminuted intertrochanteric hip fracture    IMPRESSION:   Closed, Right Intertrochanteric Fracture    PLAN:  NPO now, Needs medical

## 2025-07-10 NOTE — OP NOTE
Operative Note      Patient: Andres Aviles  YOB: 1950  MRN: [id number]    Date of Procedure: 7/10/2025    Pre-Op Diagnosis Codes:      * Closed nondisplaced intertrochanteric fracture of femur, unspecified laterality, initial encounter ([facility]) [S72.146A]    Post-Op Diagnosis: Same       Procedure(s):  RIGHT HIP INTRAMEDULLARY NAILING    Surgeon(s):  Rahul Murrell DO    Assistant:   * No surgical staff found *    Anesthesia: General    Estimated Blood Loss (mL): less than 50     Complications: None    Specimens:   * No specimens in log *    Implants:  Implant Name Type Inv. Item Serial No.  Lot No. LRB No. Used Action   SHORT TROCHANTERIC NAIL 9MM X 20CM X 125     [id number] Right 1 Implanted   TELESCOPING LAG SCREW 10.5 X 100MM     [id number] Right 1 Implanted   CORTICAL SCREW CAPTURED 5.0 X 38MM     [id number] Right 1 Implanted         Drains: * No LDAs found *    Findings:  Present At Time Of Surgery (PATOS) (choose all levels that have infection present):  No infection present  Other Findings: Right hip intertrochanteric fracture      OPERATIVE COURSE: The patient was seen and identified outside the operative suite, in which the operative site was marked as appropriate by patient, surgeon, staff, and anesthesia. After intubation, patient was placed on the fracture table. All bony prominences and neurovascular structures were well padded and protexted.  Patient's well leg was flexed, abducted, and secured into a well-padded leg well. The operative leg was placed in gentle inline traction,.  After being positioned, a time out was performed indicating the appropriate identification of the patient, the procedure to be performed, and the side to be performed upon. This was agreed upon by all individuals in the room.     Traction and reduction maneuver were applied to the operative leg. Fluoroscopy was brought in showing a near-anatomic reduction of the intertrochanteric hip fracture. The affected

## 2025-07-11 VITALS
SYSTOLIC BLOOD PRESSURE: 123 MMHG | HEART RATE: 95 BPM | WEIGHT: 172.84 LBS | HEIGHT: 67 IN | OXYGEN SATURATION: 96 % | RESPIRATION RATE: 16 BRPM | BODY MASS INDEX: 27.13 KG/M2 | TEMPERATURE: 98.2 F | DIASTOLIC BLOOD PRESSURE: 76 MMHG

## 2025-07-11 LAB
ANION GAP SERPL CALCULATED.3IONS-SCNC: 12 MMOL/L (ref 7–16)
BASOPHILS # BLD: 0.03 K/UL (ref 0–0.2)
BASOPHILS NFR BLD: 0 % (ref 0–2)
BUN SERPL-MCNC: 14 MG/DL (ref 8–23)
CALCIUM SERPL-MCNC: 8.8 MG/DL (ref 8.8–10.2)
CHLORIDE SERPL-SCNC: 103 MMOL/L (ref 98–107)
CO2 SERPL-SCNC: 24 MMOL/L (ref 22–29)
CREAT SERPL-MCNC: 0.7 MG/DL (ref 0.7–1.2)
EOSINOPHIL # BLD: 0 K/UL (ref 0.05–0.5)
EOSINOPHILS RELATIVE PERCENT: 0 % (ref 0–6)
ERYTHROCYTE [DISTWIDTH] IN BLOOD BY AUTOMATED COUNT: 15.8 % (ref 11.5–15)
GFR, ESTIMATED: >90 ML/MIN/1.73M2
GLUCOSE SERPL-MCNC: 134 MG/DL (ref 74–99)
HCT VFR BLD AUTO: 31.7 % (ref 37–54)
HGB BLD-MCNC: 10 G/DL (ref 12.5–16.5)
IMM GRANULOCYTES # BLD AUTO: 0.03 K/UL (ref 0–0.58)
IMM GRANULOCYTES NFR BLD: 0 % (ref 0–5)
LYMPHOCYTES NFR BLD: 0.75 K/UL (ref 1.5–4)
LYMPHOCYTES RELATIVE PERCENT: 8 % (ref 20–42)
MCH RBC QN AUTO: 26.7 PG (ref 26–35)
MCHC RBC AUTO-ENTMCNC: 31.5 G/DL (ref 32–34.5)
MCV RBC AUTO: 84.8 FL (ref 80–99.9)
MICROORGANISM SPEC CULT: NORMAL
MONOCYTES NFR BLD: 0.55 K/UL (ref 0.1–0.95)
MONOCYTES NFR BLD: 6 % (ref 2–12)
NEUTROPHILS NFR BLD: 86 % (ref 43–80)
NEUTS SEG NFR BLD: 8.1 K/UL (ref 1.8–7.3)
PLATELET # BLD AUTO: 229 K/UL (ref 130–450)
PMV BLD AUTO: 9 FL (ref 7–12)
POTASSIUM SERPL-SCNC: 4.4 MMOL/L (ref 3.5–5.1)
RBC # BLD AUTO: 3.74 M/UL (ref 3.8–5.8)
SODIUM SERPL-SCNC: 139 MMOL/L (ref 136–145)
SPECIMEN DESCRIPTION: NORMAL
WBC OTHER # BLD: 9.5 K/UL (ref 4.5–11.5)

## 2025-07-11 PROCEDURE — 97530 THERAPEUTIC ACTIVITIES: CPT

## 2025-07-11 PROCEDURE — 97165 OT EVAL LOW COMPLEX 30 MIN: CPT

## 2025-07-11 PROCEDURE — 6360000002 HC RX W HCPCS: Performed by: STUDENT IN AN ORGANIZED HEALTH CARE EDUCATION/TRAINING PROGRAM

## 2025-07-11 PROCEDURE — 6370000000 HC RX 637 (ALT 250 FOR IP): Performed by: STUDENT IN AN ORGANIZED HEALTH CARE EDUCATION/TRAINING PROGRAM

## 2025-07-11 PROCEDURE — 85025 COMPLETE CBC W/AUTO DIFF WBC: CPT

## 2025-07-11 PROCEDURE — 99239 HOSP IP/OBS DSCHRG MGMT >30: CPT | Performed by: INTERNAL MEDICINE

## 2025-07-11 PROCEDURE — 36415 COLL VENOUS BLD VENIPUNCTURE: CPT

## 2025-07-11 PROCEDURE — 2500000003 HC RX 250 WO HCPCS: Performed by: STUDENT IN AN ORGANIZED HEALTH CARE EDUCATION/TRAINING PROGRAM

## 2025-07-11 PROCEDURE — 97161 PT EVAL LOW COMPLEX 20 MIN: CPT

## 2025-07-11 PROCEDURE — 80048 BASIC METABOLIC PNL TOTAL CA: CPT

## 2025-07-11 RX ADMIN — SODIUM CHLORIDE, PRESERVATIVE FREE 10 ML: 5 INJECTION INTRAVENOUS at 08:22

## 2025-07-11 RX ADMIN — ESCITALOPRAM OXALATE 20 MG: 10 TABLET ORAL at 08:22

## 2025-07-11 RX ADMIN — ENOXAPARIN SODIUM 40 MG: 100 INJECTION SUBCUTANEOUS at 08:22

## 2025-07-11 RX ADMIN — Medication 2000 UNITS: at 08:22

## 2025-07-11 RX ADMIN — OXYCODONE AND ACETAMINOPHEN 1 TABLET: 5; 325 TABLET ORAL at 04:03

## 2025-07-11 RX ADMIN — MEMANTINE HYDROCHLORIDE 10 MG: 10 TABLET, FILM COATED ORAL at 08:22

## 2025-07-11 RX ADMIN — WATER 2000 MG: 1 INJECTION INTRAMUSCULAR; INTRAVENOUS; SUBCUTANEOUS at 08:21

## 2025-07-11 RX ADMIN — LISINOPRIL 10 MG: 10 TABLET ORAL at 08:22

## 2025-07-11 ASSESSMENT — PAIN - FUNCTIONAL ASSESSMENT: PAIN_FUNCTIONAL_ASSESSMENT: ACTIVITIES ARE NOT PREVENTED

## 2025-07-11 ASSESSMENT — PAIN SCALES - GENERAL
PAINLEVEL_OUTOF10: 5
PAINLEVEL_OUTOF10: 6

## 2025-07-11 ASSESSMENT — PAIN DESCRIPTION - DESCRIPTORS: DESCRIPTORS: ACHING;DISCOMFORT

## 2025-07-11 ASSESSMENT — PAIN DESCRIPTION - LOCATION: LOCATION: HIP

## 2025-07-11 ASSESSMENT — PAIN DESCRIPTION - ORIENTATION: ORIENTATION: RIGHT

## 2025-07-11 NOTE — CARE COORDINATION
Updated plan of care. POD#1 right hip IM nailing. Pt has DME. Did well with therapies. [facility] referral sent via [facility] and accepted. [facility] orders completed. [place] [place]

## 2025-07-11 NOTE — PLAN OF CARE
Problem: Chronic Conditions and Co-morbidities  Goal: Patient's chronic conditions and co-morbidity symptoms are monitored and maintained or improved  7/10/2025 2013 by Shea Barry RN  Outcome: Progressing  7/10/2025 0839 by Veronica Ahmadi RN  Outcome: Progressing     Problem: Discharge Planning  Goal: Discharge to home or other facility with appropriate resources  7/10/2025 2013 by Shea Barry RN  Outcome: Progressing  7/10/2025 0839 by Veronica Ahmadi RN  Outcome: Progressing     Problem: Pain  Goal: Verbalizes/displays adequate comfort level or baseline comfort level  7/10/2025 2013 by Shea Barry RN  Outcome: Progressing  7/10/2025 0839 by Veronica Ahmadi RN  Outcome: Progressing     Problem: Safety - Adult  Goal: Free from fall injury  7/10/2025 2013 by Shea Barry RN  Outcome: Progressing  7/10/2025 0839 by Veronica Ahmadi RN  Outcome: Progressing     Problem: Skin/Tissue Integrity  Goal: Skin integrity remains intact  Description: 1.  Monitor for areas of redness and/or skin breakdown  2.  Assess vascular access sites hourly  3.  Every 4-6 hours minimum:  Change oxygen saturation probe site  4.  Every 4-6 hours:  If on nasal continuous positive airway pressure, respiratory therapy assess nares and determine need for appliance change or resting period  7/10/2025 2013 by Shea aBrry RN  Outcome: Progressing  7/10/2025 0839 by Veronica Ahmadi RN  Outcome: Progressing

## 2025-07-11 NOTE — PLAN OF CARE
Problem: Chronic Conditions and Co-morbidities  Goal: Patient's chronic conditions and co-morbidity symptoms are monitored and maintained or improved  7/11/2025 0957 by Isaak Layton RN  Outcome: Progressing  7/10/2025 2013 by Shea Barry RN  Outcome: Progressing     Problem: Discharge Planning  Goal: Discharge to home or other facility with appropriate resources  7/11/2025 0957 by Isaak Layton RN  Outcome: Progressing  7/10/2025 2013 by Shea Barry RN  Outcome: Progressing  Flowsheets (Taken 7/10/2025 2000)  Discharge to home or other facility with appropriate resources: Identify barriers to discharge with patient and caregiver     Problem: Pain  Goal: Verbalizes/displays adequate comfort level or baseline comfort level  7/11/2025 0957 by Isaak Layton RN  Outcome: Progressing  7/10/2025 2013 by Shea Barry RN  Outcome: Progressing     Problem: Safety - Adult  Goal: Free from fall injury  7/11/2025 0957 by Isaak Layton RN  Outcome: Progressing  7/10/2025 2013 by Shea Barry RN  Outcome: Progressing     Problem: Skin/Tissue Integrity  Goal: Skin integrity remains intact  7/11/2025 0957 by Isaak Layton RN  Outcome: Progressing  7/10/2025 2013 by Shea Barry RN  Outcome: Progressing

## 2025-07-11 NOTE — PROGRESS NOTES
[facility] Hospitalist Progress Note    Admitting Date and Time: 7/9/2025  2:26 PM  Admit Dx: Closed nondisplaced intertrochanteric fracture of right femur, initial encounter (HCC) [S72.144A]  Fall, initial encounter [W19.XXXA]  Closed fracture of right hip, initial encounter (HCC) [S72.001A]  Intertrochanteric fracture of right femur, closed, initial encounter (HCC) [S72.141A]    Subjective:  Patient is being followed for Closed nondisplaced intertrochanteric fracture of right femur, initial encounter (HCC) [S72.144A]  Fall, initial encounter [W19.XXXA]  Closed fracture of right hip, initial encounter (HCC) [S72.001A]  Intertrochanteric fracture of right femur, closed, initial encounter (HCC) [S72.141A]   Pt feels pain rt hip. Npp for procedure  Per RN: No major concerns.    ROS: denies fever, chills, cp, sob, n/v, HA unless stated above.      sodium chloride flush  5-40 mL IntraVENous 2 times per day    enoxaparin  40 mg SubCUTAneous Daily    vitamin D  2,000 Units Oral Daily    escitalopram  20 mg Oral Daily    lisinopril  10 mg Oral Daily    memantine  10 mg Oral BID     sodium chloride flush, 5-40 mL, PRN  sodium chloride, , PRN  potassium chloride, 40 mEq, PRN   Or  potassium alternative oral replacement, 40 mEq, PRN   Or  potassium chloride, 10 mEq, PRN  magnesium sulfate, 2,000 mg, PRN  ondansetron, 4 mg, Q8H PRN   Or  ondansetron, 4 mg, Q6H PRN  polyethylene glycol, 17 g, Daily PRN  acetaminophen, 650 mg, Q6H PRN   Or  acetaminophen, 650 mg, Q6H PRN  oxyCODONE-acetaminophen, 1 tablet, Q4H PRN         Objective:    BP (!) 160/73   Pulse 68   Temp 97.7 °F (36.5 °C) (Oral)   Resp 16   Ht 1.702 m (5' 7.01\")   Wt 78.4 kg (172 lb 13.5 oz)   SpO2 97%   BMI 27.06 kg/m²     General Appearance: alert and oriented to person, place and time and in no acute distress  Skin: warm and dry  Head: normocephalic and atraumatic  Eyes: pupils equal, round, and reactive to light, extraocular eye movements intact, 
4 Eyes Skin Assessment     NAME:  Andres Aviles  YOB: 1950  MEDICAL RECORD NUMBER:  [id number]    The patient is being assessed for  Admission    I agree that at least one RN has performed a thorough Head to Toe Skin Assessment on the patient. ALL assessment sites listed below have been assessed.      Areas assessed by both nurses:    Head, Face, Ears, Shoulders, Back, Chest, Arms, Elbows, Hands, Sacrum. Buttock, Coccyx, Ischium, Legs. Feet and Heels, and Under Medical Devices         Does the Patient have a Wound? No noted wound(s)       Lang Prevention initiated by RN: No  Wound Care Orders initiated by RN: No    Pressure Injury (Stage 1,2,3,4, Unstageable, DTI, NWPT, and Complex wounds) if present, place Wound referral order by RN under : No    New Ostomies, if present place, Ostomy referral order under : No     Nurse 1 eSignature: Electronically signed by Dar Echols RN on 7/9/25 at 7:04 PM EDT    **SHARE this note so that the co-signing nurse can place an eSignature**    Nurse 2 eSignature: Electronically signed by Nader Drummond RN on 7/9/25 at 7:25 PM EDT   
CLINICAL PHARMACY NOTE: MEDS TO BEDS    Total # of Prescriptions Filled: 1   The following medications were delivered to the patient:  Oxycodone 5mg tabs    Additional Documentation:  To pt   
Called pts wife Shruti to let her know we still do not have a surgery time and pt will be an add on.   
Department of Orthopedic Surgery  Progress Note    Patient seen and examined. Pain controlled. No new complaints.  Denies chest pain, shortness of breath, dizziness/lightheadedness.    VITALS:  /75   Pulse 73   Temp 98.1 °F (36.7 °C) (Oral)   Resp 18   Ht 1.702 m (5' 7.01\")   Wt 78.4 kg (172 lb 13.5 oz)   SpO2 95%   BMI 27.06 kg/m²     General: alert and oriented to person, place and time, well-developed and well-nourished, in no acute distress    MUSCULOSKELETAL:   left lower extremity:  Dressing dry and intact, mild strikethrough at the distal dressing, no drainage to the dressing  Compartments soft and compressible  +PF/DF/EHL  +2/4 DP & PT pulses, Brisk Cap refill, Toes warm and perfused  Distal sensation grossly intact to Peroneals, Sural, Saphenous, and tibial nrs    CBC:   Lab Results   Component Value Date/Time    WBC 9.5 07/11/2025 04:55 AM    HGB 10.0 07/11/2025 04:55 AM    HCT 31.7 07/11/2025 04:55 AM     07/11/2025 04:55 AM     PT/INR:    Lab Results   Component Value Date/Time    PROTIME 12.0 07/09/2025 05:21 PM    INR 1.1 07/09/2025 05:21 PM         ASSESSMENT  S/P right hip intramedullary nailing from 7/10/2025    PLAN      Continue physical therapy and protocol: WBAT -right LE  24 hour abx coverage  Deep venous thrombosis prophylaxis -aspirin 81 mg twice daily, early mobilization  PT/OT  Pain Control: IV and PO  Monitor H&H  D/C Plan: Okay to discharge per orthopedics if ambulating well with PT    Electronically signed by Rahul Murrell DO on 7/11/25 at 7:08 AM EDT      
Report called to EXT [id number]  
While in patients room for bedside report, Dr. Murrell came into room to see patient.  Addressed with Dr. Murrell about breakthrough drainage on aquacel dressing, Per Dr. Murrell dressing is \"ok as long as it doesn't leak\".  Also addressed with Dr. Murrell regarding patient only having lovenox on and no oral anticoagulation medications on.  Per Dr. Murrell, he will put on oral anticoagulation on discharge.   
further education in this area   yes yes yes     ASSESSMENT:    Conditions Requiring Skilled Therapeutic Intervention:    [x]Decreased strength     []Decreased ROM  [x]Decreased functional mobility  []Decreased balance   [x]Decreased endurance   []Decreased posture  []Decreased sensation  []Decreased coordination   []Decreased vision  []Decreased safety awareness   [x]Increased pain       Comments:  Pt was found in bed and was agreeable to PT. He reports he just got done walking in the rees and using BR on his own.  He walked in the rees with me using ww and slow gait speed down to the stairs. He was able to safely perform stairs with step too pattern and then walked slowly back to his room.  He was steady on his feet.  He asked to lie back down after session and his wife and daughter arrived.      Treatment:  Patient practiced and was instructed in the following treatment:    Bed mobility, transfers, gait with ww, and stairs to improve functional strength and endurance.     Pt was left supine in bed with call light left by patient.    Pt's/ family goals   1. To go home.     Patient and or family understand(s) diagnosis, prognosis, and plan of care.    PHYSICAL THERAPY PLAN OF CARE:    PT POC is established based on physician order and patient diagnosis     Referring provider/PT Order:  PT eval and treat  Specific instructions for next treatment: ROM exs, amb with ww, and stairs.     Current Treatment Recommendations:     [x] Strengthening to improve independence with functional mobility   [x] ROM to improve flexibility and decrease stiffness and pain which will help promote independence with functional mobility   [] Balance Training to improve static/dynamic balance and to reduce fall risk  [x] Endurance Training to improve activity tolerance during functional mobility   [x] Transfer Training to improve safety and independence with all functional transfers   [x] Gait Training to improve gait mechanics, endurance 
modifications for safe mobility and completion of ADLs                             Therapeutic activity to improve functional performance during ADLs.                                         Therapeutic exercise to improve tolerance and functional strength for ADLs    Balance retraining/tolerance tasks for facilitation of postural control with dynamic challenges during ADLs .      Positioning to improve functional independence      Recommended Adaptive Equipment: wheeled walker     Home Living: Pt lives with wife, 1 story with step in    Bathroom setup: tub/shower      Prior Level of Function: Independent with ADLs , and  with IADLs; ambulated with no device       Pain Level: mild R LE pain ; stiffness   Cognition: A&O: pleasant, following commands, conversing    Memory:  fair +    Sequencing:  fair +    Problem solving:  fair +    Judgement/safety:  fair+      Functional Assessment:  AM-PAC Daily Activity Raw Score: 17/24   Initial Eval Status  Date: 7/11/2025 Treatment Status  Date: STGs = LTGs  Time frame: 10-14 days   Feeding Independent     Grooming SBA/set-up   Standing at sink washing hands, face, brushing teeth   Independent    UB Dressing Set-up   Independent   LB Dressing Mod A  Assist initiating shorts over feet , able to pull up  Wife able to assist   Educated on dressing tech   Supervision    Bathing Min A   Supervision    Toileting Supervision   Independent   Bed Mobility  SBA  Supine to sit   Independent   Functional Transfers SBA  Sit-stand from bed, commode   Mod I    Functional Mobility SBA, w/walker  Ambulated to/from bathroom   Mod I/supervision  with good tolerance    Balance Sitting:     Static:  Independent    Dynamic:Independent  Standing: SBA  Independent   Activity Tolerance No SOB observed   Good  with ADL activity    Visual/  Perceptual Glasses: yes         UE ROM/strength  AROM present throughout   Tolerate UE therapeutic activity/exercises to increase strength/endurance for ADL/xfer

## 2025-07-11 NOTE — DISCHARGE SUMMARY
joints are not widened.  There is prior left inguinal hernia repair.     Nondisplaced acute intertrochanteric fracture of the proximal right femur.     CT HIP RIGHT WO CONTRAST  Result Date: 7/9/2025  EXAMINATION: CT OF THE RIGHT HIP WITHOUT CONTRAST 7/9/2025 1:35 pm TECHNIQUE: CT of the right hip was performed without the administration of intravenous contrast.  Multiplanar reformatted images are provided for review. Automated exposure control, iterative reconstruction, and/or weight based adjustment of the mA/kV was utilized to reduce the radiation dose to as low as reasonably achievable. COMPARISON: AP pelvis and right hip x-ray 07/09/2025 HISTORY ORDERING SYSTEM PROVIDED HISTORY: fall, pain TECHNOLOGIST PROVIDED HISTORY: Reason for exam:->fall, pain Decision Support Exception - unselect if not a suspected or confirmed emergency medical condition->Emergency Medical Condition (MA) FINDINGS: There is a mildly comminuted, essentially nondisplaced intertrochanteric fracture of the proximal right femur.  The right greater trochanter is fractured and the fracture line extends medially and abuts the lesser trochanter.  No hip dislocation.  There is underlying osteoarthritis of the right hip with joint space narrowing and cartilage loss accompanied by marginal spurring of the femoral head. No additional fractures are seen.  The sacrum and pubic rami are intact.  The pubic symphysis and bilateral SI joints are not widened.  There are facet joint degenerative changes of the lumbosacral junction. The pelvis shows no free fluid or hematoma formation.  No bladder injury is seen.  The urinary bladder is poorly distended.  The prostate gland shows central calcifications and no significant enlargement.  There is large stool content within the rectal vault.  Diverticulosis coli is noted.  There is prior left inguinal hernia repair.     1.  Mildly comminuted and essentially nondisplaced intertrochanteric fracture of the proximal

## (undated) DEVICE — ELECTRODE PT RET AD L9FT HI MOIST COND ADH HYDRGEL CORDED

## (undated) DEVICE — 4-PORT MANIFOLD: Brand: NEPTUNE 2

## (undated) DEVICE — SPONGE,LAP,18"X18",STD,XR,ST,5/PK,40PK/C: Brand: MEDLINE

## (undated) DEVICE — DOUBLE BASIN SET: Brand: MEDLINE INDUSTRIES, INC.

## (undated) DEVICE — DRESSING HYDROFIBER AQUACEL AG ADVANTAGE 3.5X10 IN

## (undated) DEVICE — SYRINGE 20ML LL S/C 50

## (undated) DEVICE — GOWN,SIRUS,POLYRNF,BRTHSLV,XL,30/CS: Brand: MEDLINE

## (undated) DEVICE — GUIDE PIN 3.2MMX330MM

## (undated) DEVICE — Device

## (undated) DEVICE — GOWN,SIRUS,POLYRNF,BRTHSLV,XLN/XL,20/CS: Brand: MEDLINE

## (undated) DEVICE — GLOVE ORANGE PI 8   MSG9080

## (undated) DEVICE — TOWEL,OR,DSP,ST,BLUE,STD,6/PK,12PK/CS: Brand: MEDLINE

## (undated) DEVICE — APPLICATOR MEDICATED 26 CC SOLUTION HI LT ORNG CHLORAPREP

## (undated) DEVICE — BLADE,STAINLESS-STEEL,10,STRL,DISPOSABLE: Brand: MEDLINE

## (undated) DEVICE — SOLUTION IRRIG 1000ML 0.9% SOD CHL USP POUR PLAS BTL

## (undated) DEVICE — DRESSING HYDROFIBER AQUACEL AG ADVANTAGE 3.5X6 IN

## (undated) DEVICE — 3M™ COBAN™ NL STERILE NON-LATEX SELF-ADHERENT WRAP, 2084S, 4 IN X 5 YD (10 CM X 4,5 M), 18 ROLLS/CASE: Brand: 3M™ COBAN™

## (undated) DEVICE — GLOVE ORANGE PI 7 1/2   MSG9075

## (undated) DEVICE — DRAPE C ARM W41XL74IN UNIV MOB W RUBBERBAND CLP

## (undated) DEVICE — GUIDE PIN 3.2MMX381MM

## (undated) DEVICE — Device: Brand: COVER, PERINEAL POST, 12 PK

## (undated) DEVICE — TUBING, SUCTION, 9/32" X 10', STRAIGHT: Brand: MEDLINE

## (undated) DEVICE — NEEDLE HYPO 23GA L1.5IN TURQ POLYPR HUB S STL THN WALL IM

## (undated) DEVICE — DRAPE ISOLATN PT ST W/POCKET

## (undated) DEVICE — ACTIVATION TOOL

## (undated) DEVICE — Device: Brand: PROTECTORS, LEG SPAR BALL JOINT, 12/PR

## (undated) DEVICE — CALIBRATED DRILL 4.0MMX280MM